# Patient Record
Sex: FEMALE | Race: WHITE | Employment: STUDENT | URBAN - NONMETROPOLITAN AREA
[De-identification: names, ages, dates, MRNs, and addresses within clinical notes are randomized per-mention and may not be internally consistent; named-entity substitution may affect disease eponyms.]

---

## 2017-05-12 ENCOUNTER — HOSPITAL ENCOUNTER (OUTPATIENT)
Age: 42
Setting detail: SPECIMEN
Discharge: HOME | End: 2017-05-12
Payer: COMMERCIAL

## 2017-05-12 ENCOUNTER — OFFICE VISIT (OUTPATIENT)
Dept: UROLOGY | Age: 42
End: 2017-05-12
Payer: COMMERCIAL

## 2017-05-12 VITALS
HEIGHT: 69 IN | DIASTOLIC BLOOD PRESSURE: 78 MMHG | BODY MASS INDEX: 26.51 KG/M2 | SYSTOLIC BLOOD PRESSURE: 142 MMHG | WEIGHT: 179 LBS

## 2017-05-12 DIAGNOSIS — R31.29 MICROSCOPIC HEMATURIA: Primary | ICD-10-CM

## 2017-05-12 DIAGNOSIS — R31.29 MICROSCOPIC HEMATURIA: ICD-10-CM

## 2017-05-12 LAB
-: ABNORMAL
AMORPHOUS: ABNORMAL
BACTERIA: ABNORMAL
BILIRUBIN URINE: NEGATIVE
CASTS UA: ABNORMAL /LPF
COLOR: YELLOW
COMMENT UA: ABNORMAL
CRYSTALS, UA: ABNORMAL /HPF
EPITHELIAL CELLS UA: ABNORMAL /HPF (ref 0–25)
GLUCOSE URINE: NEGATIVE
KETONES, URINE: NEGATIVE
LEUKOCYTE ESTERASE, URINE: NEGATIVE
MUCUS: ABNORMAL
NITRITE, URINE: NEGATIVE
OTHER OBSERVATIONS UA: ABNORMAL
PH UA: 6 (ref 5–9)
PROTEIN UA: NEGATIVE
RBC UA: ABNORMAL /HPF (ref 0–2)
RENAL EPITHELIAL, UA: ABNORMAL /HPF
SPECIFIC GRAVITY UA: 1.02 (ref 1.01–1.02)
TRICHOMONAS: ABNORMAL
TURBIDITY: CLEAR
URINE HGB: ABNORMAL
UROBILINOGEN, URINE: NORMAL
WBC UA: ABNORMAL /HPF (ref 0–5)
YEAST: ABNORMAL

## 2017-05-12 PROCEDURE — 88112 CYTOPATH CELL ENHANCE TECH: CPT

## 2017-05-12 PROCEDURE — 81001 URINALYSIS AUTO W/SCOPE: CPT

## 2017-05-12 PROCEDURE — 87086 URINE CULTURE/COLONY COUNT: CPT

## 2017-05-12 PROCEDURE — 99204 OFFICE O/P NEW MOD 45 MIN: CPT | Performed by: PHYSICIAN ASSISTANT

## 2017-05-12 RX ORDER — HYDROCODONE BITARTRATE AND ACETAMINOPHEN 5; 325 MG/1; MG/1
TABLET ORAL
Refills: 0 | Status: ON HOLD | COMMUNITY
Start: 2017-04-10 | End: 2020-10-23 | Stop reason: ALTCHOICE

## 2017-05-12 ASSESSMENT — ENCOUNTER SYMPTOMS
CONSTIPATION: 0
ABDOMINAL PAIN: 0
NAUSEA: 0
EYE PAIN: 0
SHORTNESS OF BREATH: 0
ABDOMINAL DISTENTION: 0
DIARRHEA: 0
VOMITING: 0
COUGH: 0

## 2017-05-13 LAB
CULTURE: NORMAL
CULTURE: NORMAL
Lab: NORMAL
Lab: NORMAL
SPECIMEN DESCRIPTION: NORMAL
SPECIMEN DESCRIPTION: NORMAL
STATUS: NORMAL

## 2017-05-15 LAB
CASE NUMBER:: NORMAL
SPECIMEN DESCRIPTION: NORMAL
SURGICAL PATHOLOGY REPORT: NORMAL

## 2017-06-19 ENCOUNTER — PROCEDURE VISIT (OUTPATIENT)
Dept: UROLOGY | Age: 42
End: 2017-06-19
Payer: COMMERCIAL

## 2017-06-19 VITALS
BODY MASS INDEX: 25.33 KG/M2 | WEIGHT: 171 LBS | HEIGHT: 69 IN | DIASTOLIC BLOOD PRESSURE: 78 MMHG | SYSTOLIC BLOOD PRESSURE: 124 MMHG

## 2017-06-19 DIAGNOSIS — R31.29 MICROHEMATURIA: Primary | ICD-10-CM

## 2017-06-19 PROCEDURE — 99213 OFFICE O/P EST LOW 20 MIN: CPT | Performed by: UROLOGY

## 2017-06-19 PROCEDURE — 52000 CYSTOURETHROSCOPY: CPT | Performed by: UROLOGY

## 2017-06-19 RX ORDER — HYDROXYZINE PAMOATE 25 MG/1
CAPSULE ORAL
Refills: 1 | Status: ON HOLD | COMMUNITY
Start: 2017-06-12 | End: 2020-10-23

## 2017-06-19 ASSESSMENT — ENCOUNTER SYMPTOMS
COUGH: 0
SHORTNESS OF BREATH: 0
COLOR CHANGE: 0
NAUSEA: 0
ABDOMINAL PAIN: 0
WHEEZING: 0
EYE REDNESS: 0
VOMITING: 0
BACK PAIN: 0
EYE PAIN: 0

## 2017-06-29 ENCOUNTER — TELEPHONE (OUTPATIENT)
Dept: NEUROLOGY | Age: 42
End: 2017-06-29

## 2018-01-09 RX ORDER — PRENATAL VIT/IRON FUM/FOLIC AC 27MG-0.8MG
1 TABLET ORAL DAILY
Qty: 30 TABLET | Refills: 2 | OUTPATIENT
Start: 2018-01-09

## 2020-10-22 ENCOUNTER — HOSPITAL ENCOUNTER (INPATIENT)
Age: 45
LOS: 7 days | Discharge: HOME | DRG: 753 | End: 2020-10-29
Attending: PSYCHIATRY & NEUROLOGY | Admitting: PSYCHIATRY & NEUROLOGY
Payer: COMMERCIAL

## 2020-10-22 PROBLEM — F23 ACUTE PSYCHOSIS (HCC): Status: ACTIVE | Noted: 2020-10-22

## 2020-10-22 PROCEDURE — 1240000000 HC EMOTIONAL WELLNESS R&B

## 2020-10-22 PROCEDURE — 6370000000 HC RX 637 (ALT 250 FOR IP): Performed by: PSYCHIATRY & NEUROLOGY

## 2020-10-22 PROCEDURE — 6370000000 HC RX 637 (ALT 250 FOR IP): Performed by: INTERNAL MEDICINE

## 2020-10-22 RX ORDER — NICOTINE 21 MG/24HR
1 PATCH, TRANSDERMAL 24 HOURS TRANSDERMAL DAILY
Status: DISCONTINUED | OUTPATIENT
Start: 2020-10-22 | End: 2020-10-29 | Stop reason: HOSPADM

## 2020-10-22 RX ORDER — HYDROXYZINE 50 MG/1
50 TABLET, FILM COATED ORAL 3 TIMES DAILY PRN
Status: DISCONTINUED | OUTPATIENT
Start: 2020-10-22 | End: 2020-10-29 | Stop reason: HOSPADM

## 2020-10-22 RX ORDER — HALOPERIDOL 5 MG/ML
5 INJECTION INTRAMUSCULAR EVERY 4 HOURS PRN
Status: DISCONTINUED | OUTPATIENT
Start: 2020-10-22 | End: 2020-10-29 | Stop reason: HOSPADM

## 2020-10-22 RX ORDER — POLYETHYLENE GLYCOL 3350 17 G/17G
17 POWDER, FOR SOLUTION ORAL DAILY PRN
Status: DISCONTINUED | OUTPATIENT
Start: 2020-10-22 | End: 2020-10-29 | Stop reason: HOSPADM

## 2020-10-22 RX ORDER — ACETAMINOPHEN 325 MG/1
650 TABLET ORAL EVERY 4 HOURS PRN
Status: DISCONTINUED | OUTPATIENT
Start: 2020-10-22 | End: 2020-10-29 | Stop reason: HOSPADM

## 2020-10-22 RX ORDER — HALOPERIDOL 10 MG/1
5 TABLET ORAL EVERY 4 HOURS PRN
Status: DISCONTINUED | OUTPATIENT
Start: 2020-10-22 | End: 2020-10-29 | Stop reason: HOSPADM

## 2020-10-22 RX ORDER — MAGNESIUM HYDROXIDE/ALUMINUM HYDROXICE/SIMETHICONE 120; 1200; 1200 MG/30ML; MG/30ML; MG/30ML
30 SUSPENSION ORAL EVERY 6 HOURS PRN
Status: DISCONTINUED | OUTPATIENT
Start: 2020-10-22 | End: 2020-10-29 | Stop reason: HOSPADM

## 2020-10-22 RX ORDER — LORAZEPAM 1 MG/1
2 TABLET ORAL EVERY 4 HOURS PRN
Status: DISCONTINUED | OUTPATIENT
Start: 2020-10-22 | End: 2020-10-29 | Stop reason: HOSPADM

## 2020-10-22 RX ORDER — TRAZODONE HYDROCHLORIDE 50 MG/1
50 TABLET ORAL NIGHTLY PRN
Status: DISCONTINUED | OUTPATIENT
Start: 2020-10-22 | End: 2020-10-29 | Stop reason: HOSPADM

## 2020-10-22 RX ORDER — DIPHENHYDRAMINE HYDROCHLORIDE 50 MG/ML
50 INJECTION INTRAMUSCULAR; INTRAVENOUS EVERY 4 HOURS PRN
Status: DISCONTINUED | OUTPATIENT
Start: 2020-10-22 | End: 2020-10-29 | Stop reason: HOSPADM

## 2020-10-22 RX ORDER — AMLODIPINE BESYLATE 5 MG/1
5 TABLET ORAL DAILY
Status: DISCONTINUED | OUTPATIENT
Start: 2020-10-22 | End: 2020-10-29 | Stop reason: HOSPADM

## 2020-10-22 RX ORDER — LORAZEPAM 2 MG/ML
2 INJECTION INTRAMUSCULAR EVERY 4 HOURS PRN
Status: DISCONTINUED | OUTPATIENT
Start: 2020-10-22 | End: 2020-10-29 | Stop reason: HOSPADM

## 2020-10-22 RX ADMIN — HYDROXYZINE HYDROCHLORIDE 50 MG: 50 TABLET, FILM COATED ORAL at 16:24

## 2020-10-22 RX ADMIN — AMLODIPINE BESYLATE 5 MG: 5 TABLET ORAL at 16:24

## 2020-10-22 ASSESSMENT — SLEEP AND FATIGUE QUESTIONNAIRES
RESTFUL SLEEP: NO
SLEEP PATTERN: DISTURBED/INTERRUPTED SLEEP;DIFFICULTY FALLING ASLEEP
DIFFICULTY ARISING: NO
DO YOU HAVE DIFFICULTY SLEEPING: YES
DIFFICULTY STAYING ASLEEP: YES
AVERAGE NUMBER OF SLEEP HOURS: 4
DO YOU USE A SLEEP AID: NO
DIFFICULTY FALLING ASLEEP: YES

## 2020-10-22 ASSESSMENT — LIFESTYLE VARIABLES: HISTORY_ALCOHOL_USE: NO
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
## 2020-10-22 NOTE — PROGRESS NOTES
RT staff unable to complete RT assessment this date. Staff will attempt to assess in the next 24 hours.

## 2020-10-22 NOTE — BH NOTE
Patient is anxious as evidenced by increased internal stimuli, hitting fist on bed, and crying. Patient refused one to one talk time, shower, and decreased stimuli. Patient accepting of PRN. Will continue to monitor.

## 2020-10-22 NOTE — BH NOTE
Patient given tobacco quitline number 19238689496 at this time, refusing to call at this time, states \" I just dont want to quit now\"- patient given information as to the dangers of long term tobacco use. Continue to reinforce the importance of tobacco cessation.

## 2020-10-23 PROCEDURE — 6370000000 HC RX 637 (ALT 250 FOR IP): Performed by: PSYCHIATRY & NEUROLOGY

## 2020-10-23 PROCEDURE — 6370000000 HC RX 637 (ALT 250 FOR IP): Performed by: INTERNAL MEDICINE

## 2020-10-23 PROCEDURE — 99254 IP/OBS CNSLTJ NEW/EST MOD 60: CPT | Performed by: INTERNAL MEDICINE

## 2020-10-23 PROCEDURE — 99223 1ST HOSP IP/OBS HIGH 75: CPT | Performed by: PSYCHIATRY & NEUROLOGY

## 2020-10-23 PROCEDURE — 1240000000 HC EMOTIONAL WELLNESS R&B

## 2020-10-23 RX ORDER — QUETIAPINE FUMARATE 50 MG/1
50 TABLET, FILM COATED ORAL ONCE
Status: COMPLETED | OUTPATIENT
Start: 2020-10-23 | End: 2020-10-23

## 2020-10-23 RX ORDER — QUETIAPINE FUMARATE 100 MG/1
100 TABLET, FILM COATED ORAL 2 TIMES DAILY
Status: DISCONTINUED | OUTPATIENT
Start: 2020-10-23 | End: 2020-10-24

## 2020-10-23 RX ORDER — MONTELUKAST SODIUM 10 MG/1
10 TABLET ORAL NIGHTLY
Status: DISCONTINUED | OUTPATIENT
Start: 2020-10-23 | End: 2020-10-29 | Stop reason: HOSPADM

## 2020-10-23 RX ORDER — ALBUTEROL SULFATE 90 UG/1
2 AEROSOL, METERED RESPIRATORY (INHALATION) EVERY 6 HOURS PRN
Status: DISCONTINUED | OUTPATIENT
Start: 2020-10-23 | End: 2020-10-29 | Stop reason: HOSPADM

## 2020-10-23 RX ORDER — BUDESONIDE AND FORMOTEROL FUMARATE DIHYDRATE 160; 4.5 UG/1; UG/1
2 AEROSOL RESPIRATORY (INHALATION) 2 TIMES DAILY
Status: DISCONTINUED | OUTPATIENT
Start: 2020-10-23 | End: 2020-10-29 | Stop reason: HOSPADM

## 2020-10-23 RX ADMIN — QUETIAPINE FUMARATE 100 MG: 100 TABLET ORAL at 20:58

## 2020-10-23 RX ADMIN — HYDROXYZINE HYDROCHLORIDE 50 MG: 50 TABLET, FILM COATED ORAL at 20:58

## 2020-10-23 RX ADMIN — AMLODIPINE BESYLATE 5 MG: 5 TABLET ORAL at 09:17

## 2020-10-23 RX ADMIN — QUETIAPINE FUMARATE 50 MG: 50 TABLET ORAL at 14:16

## 2020-10-23 RX ADMIN — MONTELUKAST 10 MG: 10 TABLET, FILM COATED ORAL at 20:58

## 2020-10-23 ASSESSMENT — SLEEP AND FATIGUE QUESTIONNAIRES
DO YOU USE A SLEEP AID: NO
RESTFUL SLEEP: NO
SLEEP PATTERN: DIFFICULTY FALLING ASLEEP
DO YOU HAVE DIFFICULTY SLEEPING: YES
DIFFICULTY FALLING ASLEEP: YES
DIFFICULTY ARISING: NO
DIFFICULTY STAYING ASLEEP: YES
AVERAGE NUMBER OF SLEEP HOURS: 4

## 2020-10-23 NOTE — SUICIDE SAFETY PLAN
SAFETY PLAN    A suicide Safety Plan is a document that supports someone when they are having thoughts of suicide. Warning Signs that indicate a suicidal crisis may be developing: What (situations, thoughts, feelings, body sensations, behaviors, etc.) do you experience that lets you know you are beginning to think about suicide? 1. I feel my mind racing. 2. People accuse me of acting crazy  3. I'm having conflict with my . Internal Coping Strategies:  What things can I do (relaxation techniques, hobbies, physical activities, etc.) to take my mind off my problems without contacting another person? 1. Go for walk  2. Listen to music  3. Spend time with dog    People and social settings that provide distraction: Who can I call or where can I go to distract me? 1. Name: My   Phone: look in phone  2. Name: My mom Phone: look in phone       People whom I can ask for help: Who can I call when I need help - for example, friends, family, clergy, someone else? 1. Name: My                 Phone: look in phone  2. Name: My mom Phone: look in phone      Professionals or [address] [place] [address] I can contact during a crisis: Who can I call for help - for example, my doctor, my psychiatrist, my psychologist, a mental health provider, a suicide hotline? 1. [facility] [phone]    3. Suicide Prevention Lifeline: 3-649-387-TALK (9325)    1. 4. National Association of Mental Illness, 6-104.402.5127  2. [facility] Substance Abuse National Helpline, 2-983-182-HELP (7355)  3. Crisis Text Line, Text 4HOPE to 740789 to connect with a crisis counselor  4. National Domestic Violence Hotline, 7-590.471.9355  [place] (Rape, [address]), 7-715.747.8254    Making the environment safe: How can I make my environment (house/apartment/living space) safer? For example, can I remove guns, medications, and other items? 1. Take my medications as prescribed  2.  Talk out my feelings and frustrations.

## 2020-10-23 NOTE — CONSULTS
[facility]    CONSULTATION  / FOLLOW UP VISIT       Date:   10/23/2020  Patient name:  Rc Medley May  Date of admission:  10/22/2020  3:06 PM  MRN:   [id number]  Account:  [de-identified]  YOB: 1975  PCP:    Ena Barron MD  Room:   0110113-01  Code Status:    Full Code    Physician Requesting Consult: Nery Queen MD    History of Present Illness:      C/C ;       REASON FOR CONSULT;  Medical comorbidity and medication management ;                                                 *Active Problems:    Essential hypertension    Migraine without aura and without status migrainosus, not intractable    MS (multiple sclerosis) (HCC)    Moderate intermittent asthma    Acute psychosis ([address]  Resolved Problems:    * No resolved hospital problems. *            HPI;     10/23/2020    · We have been consulted to see this patient for medical management of medical comorbidities 1. Patient is known to have hypertension and is on amlodipine  2. Patient known to have asthma and is on Symbicort and albuterol  3. Patient also has a history of multiple sclerosis migraines in the past         History on admission;    Prior to Admission medications    Not on File      Blood pressur . he is mildly elevated     Significant last 24 hr data reviewed  [x] yes     Vitals:    10/22/20 1500 10/22/20 1509 10/22/20 1528 10/23/20 0917   BP: (!) 151/81 (!) 151/81  (!) 150/79   Pulse: 86 86 86    Resp: 18      Weight: 134 lb (60.8 kg)      Height: 5' 7\" (1.702 m)         No results found for this or any previous visit (from the past 24 hour(s)). No results for input(s): POCGLU in the last 72 hours. No results found. ---     Past and surgical history as recorded in H&P  Social History:     Tobacco:    reports that she has been smoking cigarettes. She has never used smokeless tobacco.  Alcohol:      reports no history of alcohol use.   Drug Use:  reports no history of drug use. Review of Systems:     POSITIVE AND NEGATIVES AS DESCRIBED IN HISTORY OF PRESENT ILLNESS ;  IN ADDITION ;  Review of Systems          All other systems negative                Physical Exam:     Physical Exam   Vitals:    10/22/20 1500 10/22/20 1509 10/22/20 1528 10/23/20 0917   BP: (!) 151/81 (!) 151/81  (!) 150/79   Pulse: 86 86 86    Resp: 18      Weight: 134 lb (60.8 kg)      Height: 5' 7\" (1.702 m)                      Body mass index is 20.99 kg/m². General Appearance:   -, CO-OPERATIVE ,                                                        Pulmonary/Chest:        Clear to auscultation bilaterally . No wheezes, rales or rhonchi . Cardiovascular:            Normal rate, regular rhythm,                                          No murmur or  Gallop . Abdomen:                       Soft, non-tender                                                                                    Extremities:                    No Edema .                                               Mental status as per psych notes         Data:     Labs:      URINE ANALYSIS: No results found for: LABURIN     CBC:  Lab Results   Component Value Date    WBC 10.5 02/19/2016    HGB 14.4 02/19/2016     02/19/2016        BMP:    Lab Results   Component Value Date     02/19/2016    K 4.0 02/19/2016     02/19/2016    CO2 22 02/19/2016    BUN 13 02/19/2016    CREATININE 0.62 02/19/2016    GLUCOSE 96 02/19/2016      LIVER PROFILE:  Lab Results   Component Value Date    ALT 18 02/19/2016    AST 19 02/19/2016    PROT 7.8 02/19/2016    BILITOT 0.35 02/19/2016    LABALBU 4.5 02/19/2016             Radiology:           Assessment :      Primary Problem  Active Problems:    Essential hypertension    Migraine without aura and without status migrainosus, not intractable    MS (multiple sclerosis) (HCC)    Moderate intermittent asthma    Acute psychosis ([address]  Resolved

## 2020-10-23 NOTE — PLAN OF CARE
Problem: Anger Management/Homicidal Ideation:  Goal: Absence of angry outbursts  Description: Absence of angry outbursts  Outcome: Met This Shift  Note: Patient flight of ideas and increased energy during assessment, patient visibly sweating, she denies substance abuse, refused evening vitals but allowed all other assessments, paranoid that she does not need to be here and someone else made her come and nothing is wrong with her, after being alone in her room and decreasing her stimuli she is getting some rest     Problem: Anger Management/Homicidal Ideation:  Goal: Absence of homicidal ideation  Description: Absence of homicidal ideation  Outcome: Met This Shift  Note: Denies saying \"they made that up to get me in here, but I'm not blaming you.      Problem: Altered Mood, Manic Behavior:  Goal: Ability to demonstrate self-control will improve  Description: Ability to demonstrate self-control will improve  Outcome: Met This Shift  Note: Patient refused vitals but otherwise cooperative with care, flight of ideas and pressured speech during interview but cooperative with care, denies any needs on assessment     Problem: Altered Mood, Manic Behavior:  Goal: Able to verbalize decrease in frequency and intensity of racing thoughts  Description: Able to verbalize decrease in frequency and intensity of racing thoughts  Outcome: Ongoing  Note: On initial assessment patient has flight of ideas and exhibits racing thoughts, after time in her room with decreased stimuli patient was able to fall asleep on her own

## 2020-10-23 NOTE — GROUP NOTE
Group Therapy Note    Date: 10/23/2020    Group Start Time: 1330  Group End Time: 8498  Group Topic: Cognitive Skills    [facility]    [place], [place]        Group Therapy Note    Attendees: 6/20         Patient's Goal:  To increase interpersonal interaction. Notes:  Pt was in and out of group several times, and had trouble following directions while in group.      Status After Intervention:  Unchanged    Participation Level: Interactive    Participation Quality: Sharing and Intrusive      Speech:  pressured      Thought Process/Content: Flight of ideas      Affective Functioning: Exaggerated      Mood: elevated      Level of consciousness:  Inattentive      Response to Learning: Progressing to goal      Endings: None Reported    Modes of Intervention: Socialization, Problem-solving, Activity, Media and Reality-testing      Discipline Responsible: Psychoeducational Specialist      Signature:  Leah Villanueva
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
## 2020-10-23 NOTE — GROUP NOTE
Group Therapy Note    Date: 10/23/2020    Group Start Time: 1100  Group End Time: 9482  Group Topic: Psychoeducation    BRAD Klein, CTRS        Group Therapy Note    Attendees: 3    Pt did not attend Therapeutic Recreation d/t resting in room despite staff invitation to attend. 1:1 talk time offered as alternative to group session, pt declined.
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
## 2020-10-23 NOTE — PLAN OF CARE
[facility]  Initial Interdisciplinary Treatment Plan NO      Original treatment plan Date & Time: 10/23/2020   0808    Admission Type:  Admission Type:  Involuntary    Reason for admission:   Reason for Admission: paranoid persecutory thoughts homicidal ideation towards  has not been taking medicine for 1.5 years    Estimated Length of Stay:  5-7days  Estimated Discharge Date: to be determined by physician    PATIENT STRENGTHS:  Patient Strengths:Strengths: Motivated  Patient Strengths and Limitations:Limitations: External locus of control, Lacks leisure interests  Addictive Behavior: Addictive Behavior  In the past 3 months, have you felt or has someone told you that you have a problem with:  : Eating (too much/too little)  Do you have a history of Chemical Use?: No  Do you have a history of Alcohol Use?: No  Do you have a history of Street Drug Abuse?: No  Histroy of Prescripton Drug Abuse?: No  Medical Problems:  Past Medical History:   Diagnosis Date    Allergic rhinitis     Asthma     Headache     Hypertension      Status EXAM:Status and Exam  Normal: No  Facial Expression: Exaggerated  Affect: Incongruent  Level of Consciousness: Alert  Mood:Normal: No  Mood: Anxious, Suspicious  Motor Activity:Normal: No  Motor Activity: Repetitive Acts  Interview Behavior: Cooperative  Preception: [place] to Person, [place] to Time, [place] to Place, [place] to Situation  Attention:Normal: No  Attention: Distractible  Thought Processes: Flt.of Ideas  Thought Content:Normal: No  Thought Content: Paranoia, Preoccupations  Hallucinations: (denies)  Delusions: Yes  Delusions: Persecution  Memory:Normal: No  Memory: Confabulation, Poor Recent, Poor Remote  Insight and Judgment: No  Insight and Judgment: Poor Judgment, Poor Insight  Present Suicidal Ideation: No  Present Homicidal Ideation: No    EDUCATION:   Learner Progress Toward Treatment Goals: reviewed group plans and strategies for care    Method:group therapy, medication compliance, individualized assessments and care planning    Outcome: needs reinforcement    PATIENT GOALS: to be discussed with patient within 72 hours    PLAN/TREATMENT RECOMMENDATIONS:     continue group therapy , medications compliance, goal setting, individualized assessments and care, continue to monitor pt on unit      SHORT-TERM GOALS:   Time frame for Short-Term Goals: 5-7 days    LONG-TERM GOALS:  Time frame for Long-Term Goals: 6 months  Members Present in Team Meeting: See [facility] Ray Sweet

## 2020-10-23 NOTE — GROUP NOTE
Group Therapy Note    Date: 10/23/2020    Group Start Time: 1600  Group End Time: 1640  Group Topic: Healthy Living/Wellness    [facility]    Ladarius White LPN    Patient refused to attend 1600 Health and Wellness group. 1:1 alternative was offered.     Group Therapy Note    Attendees: 7       Signature:  Ladarius White LPN

## 2020-10-23 NOTE — PROGRESS NOTES
Pharmacy Medication History Note      List of current medications patient is taking is complete. Source of information: [facility], [facility]    Per pharmacy, the patient has not filled any prescriptions since January. At that time she had only filled an antibiotic. Please let me know if you have any questions about this encounter. Thank you!     Electronically signed by Jamal Sorensen, [address] [place] [address] on 10/23/2020 at 9:15 AM

## 2020-10-23 NOTE — PLAN OF CARE
Problem: Altered Mood, Manic Behavior:  Goal: Able to verbalize decrease in frequency and intensity of racing thoughts  Description: Able to verbalize decrease in frequency and intensity of racing thoughts  10/23/2020 1141 by Chacho Bourne LPN  Outcome: Ongoing   Patient is manic/hyperverbal. Patient is directable. Denies thoughts of self harm. Support and encouragement provided.

## 2020-10-23 NOTE — GROUP NOTE
Group Therapy Note    Date: 10/23/2020    Group Start Time: 0900  Group End Time: 0915  Group Topic: Community Meeting    BRAD Muñoz, [address]        Group Therapy Note    Attendees: 5/22      Pt did not attend [employer] d/t resting in room despite staff invitation to attend. 1:1 talk time offered as alternative to group session, pt declined.

## 2020-10-23 NOTE — GROUP NOTE
Group Therapy Note    Date: 10/23/2020    Group Start Time: 1000  Group End Time: 1100  Group Topic: Psychotherapy    [facility]    CARLITOS Khan        Group Therapy Note    Attendees: 4/11       Patient's Goal:  To actively participate in psychotherapy group and remain in the here-and-now    Notes:  Client actively participates in group as it relates to discussing things they can control in their lives as well as things that are out of their control    Status After Intervention:  Unchanged    Participation Level: Monopolizing    Participation Quality: Sharing, Inappropriate and Intrusive      Speech:  pressured and loud      Thought Process/Content: Flight of ideas      Affective Functioning: Incongruent and Exaggerated      Mood: anxious      Level of consciousness:  Alert, Attentive and Preoccupied      Response to Learning: Able to verbalize current knowledge/experience      Endings: None Reported    Modes of Intervention: Education, Exploration and Confrontation      Discipline Responsible: /Counselor      Signature:  CARLITOS Khan

## 2020-10-24 LAB
ABSOLUTE EOS #: 0.1 K/UL (ref 0–0.4)
ABSOLUTE IMMATURE GRANULOCYTE: ABNORMAL K/UL (ref 0–0.3)
ABSOLUTE LYMPH #: 2.1 K/UL (ref 1–4.8)
ABSOLUTE MONO #: 0.6 K/UL (ref 0.1–1.3)
ANION GAP SERPL CALCULATED.3IONS-SCNC: 7 MMOL/L (ref 9–17)
BASOPHILS # BLD: 1 % (ref 0–2)
BASOPHILS ABSOLUTE: 0 K/UL (ref 0–0.2)
BUN BLDV-MCNC: 13 MG/DL (ref 6–20)
BUN/CREAT BLD: ABNORMAL (ref 9–20)
CALCIUM SERPL-MCNC: 9.6 MG/DL (ref 8.6–10.4)
CHLORIDE BLD-SCNC: 108 MMOL/L (ref 98–107)
CO2: 28 MMOL/L (ref 20–31)
CREAT SERPL-MCNC: 0.67 MG/DL (ref 0.5–0.9)
DIFFERENTIAL TYPE: ABNORMAL
EOSINOPHILS RELATIVE PERCENT: 2 % (ref 0–4)
GFR AFRICAN AMERICAN: >60 ML/MIN
GFR NON-AFRICAN AMERICAN: >60 ML/MIN
GFR SERPL CREATININE-BSD FRML MDRD: ABNORMAL ML/MIN/{1.73_M2}
GFR SERPL CREATININE-BSD FRML MDRD: ABNORMAL ML/MIN/{1.73_M2}
GLUCOSE BLD-MCNC: 101 MG/DL (ref 70–99)
HCT VFR BLD CALC: 40.2 % (ref 36–46)
HEMOGLOBIN: 13.9 G/DL (ref 12–16)
IMMATURE GRANULOCYTES: ABNORMAL %
LYMPHOCYTES # BLD: 30 % (ref 24–44)
MCH RBC QN AUTO: 31.6 PG (ref 26–34)
MCHC RBC AUTO-ENTMCNC: 34.7 G/DL (ref 31–37)
MCV RBC AUTO: 91.2 FL (ref 80–100)
MONOCYTES # BLD: 9 % (ref 1–7)
NRBC AUTOMATED: ABNORMAL PER 100 WBC
PDW BLD-RTO: 14 % (ref 11.5–14.9)
PLATELET # BLD: 319 K/UL (ref 150–450)
PLATELET ESTIMATE: ABNORMAL
PMV BLD AUTO: 6.9 FL (ref 6–12)
POTASSIUM SERPL-SCNC: 4.9 MMOL/L (ref 3.7–5.3)
RBC # BLD: 4.4 M/UL (ref 4–5.2)
RBC # BLD: ABNORMAL 10*6/UL
SEG NEUTROPHILS: 58 % (ref 36–66)
SEGMENTED NEUTROPHILS ABSOLUTE COUNT: 4.1 K/UL (ref 1.3–9.1)
SODIUM BLD-SCNC: 143 MMOL/L (ref 135–144)
TSH SERPL DL<=0.05 MIU/L-ACNC: 0.79 MIU/L (ref 0.3–5)
WBC # BLD: 7 K/UL (ref 3.5–11)
WBC # BLD: ABNORMAL 10*3/UL

## 2020-10-24 PROCEDURE — 6370000000 HC RX 637 (ALT 250 FOR IP): Performed by: PSYCHIATRY & NEUROLOGY

## 2020-10-24 PROCEDURE — 80048 BASIC METABOLIC PNL TOTAL CA: CPT

## 2020-10-24 PROCEDURE — 99231 SBSQ HOSP IP/OBS SF/LOW 25: CPT | Performed by: INTERNAL MEDICINE

## 2020-10-24 PROCEDURE — 36415 COLL VENOUS BLD VENIPUNCTURE: CPT

## 2020-10-24 PROCEDURE — 6370000000 HC RX 637 (ALT 250 FOR IP): Performed by: NURSE PRACTITIONER

## 2020-10-24 PROCEDURE — 1240000000 HC EMOTIONAL WELLNESS R&B

## 2020-10-24 PROCEDURE — 85025 COMPLETE CBC W/AUTO DIFF WBC: CPT

## 2020-10-24 PROCEDURE — 6370000000 HC RX 637 (ALT 250 FOR IP): Performed by: INTERNAL MEDICINE

## 2020-10-24 PROCEDURE — 99232 SBSQ HOSP IP/OBS MODERATE 35: CPT | Performed by: NURSE PRACTITIONER

## 2020-10-24 PROCEDURE — 84443 ASSAY THYROID STIM HORMONE: CPT

## 2020-10-24 RX ADMIN — ACETAMINOPHEN 650 MG: 325 TABLET, FILM COATED ORAL at 21:45

## 2020-10-24 RX ADMIN — QUETIAPINE FUMARATE 100 MG: 100 TABLET ORAL at 08:47

## 2020-10-24 RX ADMIN — AMLODIPINE BESYLATE 5 MG: 5 TABLET ORAL at 08:47

## 2020-10-24 RX ADMIN — MONTELUKAST 10 MG: 10 TABLET, FILM COATED ORAL at 21:45

## 2020-10-24 RX ADMIN — QUETIAPINE FUMARATE 150 MG: 100 TABLET ORAL at 21:45

## 2020-10-24 ASSESSMENT — PAIN SCALES - GENERAL
PAINLEVEL_OUTOF10: 0
PAINLEVEL_OUTOF10: 10
PAINLEVEL_OUTOF10: 3

## 2020-10-24 ASSESSMENT — PAIN DESCRIPTION - ORIENTATION: ORIENTATION: LEFT

## 2020-10-24 ASSESSMENT — PAIN DESCRIPTION - LOCATION: LOCATION: ARM

## 2020-10-24 NOTE — GROUP NOTE
Group Therapy Note    Date: 10/24/2020    Group Start Time: 1000  Group End Time: 2266  Group Topic: Psychoeducation    Via MALA Hidalgo, EDGARW        Group Therapy Note    Attendees: 6/18         Patient's Goal: Communication, critical thinking and active listening    Notes:  Brayden Sandra is Like. Che Jean Claude \" Game to describe items. Pt shared, participated, interactive, supportive. Pts engaged throughout activity and cooperative with others. Identified coping skill to slow thoughts and improve mood     Status After Intervention:  Improved    Participation Level: Active Listener and Interactive    Participation Quality: Appropriate, Attentive, Sharing and Supportive      Speech:  normal      Thought Process/Content: Logical  Linear      Affective Functioning: Congruent      Mood: euphoric and euthymic      Level of consciousness:  Alert, Oriented x4 and Attentive      Response to Learning: Able to retain information, Capable of insight and Progressing to goal      Endings: None Reported    Modes of Intervention: Education, Socialization, Exploration and Activity      Discipline Responsible: /Counselor      Signature:   MALA Vigil LSW

## 2020-10-24 NOTE — PROGRESS NOTES
content:  denies homicidal ideation  Suicidal Ideation: Denies suicidal ideation  Delusions:  no evidence of delusions  Perceptual Disturbance:  denies any perceptual disturbance  Cognition:  Oriented to person, place   Memory: Impaired  Insight & Judgement: Poor  Medication side effects:  denies       Data   height is 5' 7\" (1.702 m) and weight is 134 lb (60.8 kg). Her oral temperature is 98.2 °F (36.8 °C). Her blood pressure is 139/67 and her pulse is 93. Her respiration is 16.    Labs:   Admission on 10/22/2020   Component Date Value Ref Range Status    WBC 10/24/2020 7.0  3.5 - 11.0 k/uL Final    RBC 10/24/2020 4.40  4.0 - 5.2 m/uL Final    Hemoglobin 10/24/2020 13.9  12.0 - 16.0 g/dL Final    Hematocrit 10/24/2020 40.2  36 - 46 % Final    MCV 10/24/2020 91.2  80 - 100 fL Final    MCH 10/24/2020 31.6  26 - 34 pg Final    MCHC 10/24/2020 34.7  31 - 37 g/dL Final    RDW 10/24/2020 14.0  11.5 - 14.9 % Final    Platelets 77/11/3023 319  150 - 450 k/uL Final    MPV 10/24/2020 6.9  6.0 - 12.0 fL Final    NRBC Automated 10/24/2020 NOT REPORTED  per 100 WBC Final    Differential Type 10/24/2020 NOT REPORTED   Final    Seg Neutrophils 10/24/2020 58  36 - 66 % Final    Lymphocytes 10/24/2020 30  24 - 44 % Final    Monocytes 10/24/2020 9* 1 - 7 % Final    Eosinophils % 10/24/2020 2  0 - 4 % Final    Basophils 10/24/2020 1  0 - 2 % Final    Immature Granulocytes 10/24/2020 NOT REPORTED  0 % Final    Segs Absolute 10/24/2020 4.10  1.3 - 9.1 k/uL Final    Absolute Lymph # 10/24/2020 2.10  1.0 - 4.8 k/uL Final    Absolute Mono # 10/24/2020 0.60  0.1 - 1.3 k/uL Final    Absolute Eos # 10/24/2020 0.10  0.0 - 0.4 k/uL Final    Basophils Absolute 10/24/2020 0.00  0.0 - 0.2 k/uL Final    Absolute Immature Granulocyte 10/24/2020 NOT REPORTED  0.00 - 0.30 k/uL Final    WBC Morphology 10/24/2020 NOT REPORTED   Final    RBC Morphology 10/24/2020 NOT REPORTED   Final    Platelet Estimate 42/74/7590 NOT REPORTED Final    Glucose 10/24/2020 101* 70 - 99 mg/dL Final    BUN 10/24/2020 13  6 - 20 mg/dL Final    CREATININE 10/24/2020 0.67  0.50 - 0.90 mg/dL Final    Bun/Cre Ratio 10/24/2020 NOT REPORTED  9 - 20 Final    Calcium 10/24/2020 9.6  8.6 - 10.4 mg/dL Final    Sodium 10/24/2020 143  135 - 144 mmol/L Final    Potassium 10/24/2020 4.9  3.7 - 5.3 mmol/L Final    Chloride 10/24/2020 108* 98 - 107 mmol/L Final    CO2 10/24/2020 28  20 - 31 mmol/L Final    Anion Gap 10/24/2020 7* 9 - 17 mmol/L Final    GFR Non- 10/24/2020 >60  >60 mL/min Final    GFR  10/24/2020 >60  >60 mL/min Final    GFR Comment 10/24/2020        Final    Comment: Average GFR for 38-51 years old:   80 mL/min/1.73sq m  Chronic Kidney Disease:   <60 mL/min/1.73sq m  Kidney failure:   <15 mL/min/1.73sq m              eGFR calculated using average adult body mass.  Additional eGFR calculator available at:        [employer]            GFR Staging 10/24/2020 NOT REPORTED   Final    TSH 10/24/2020 0.79  0.30 - 5.00 mIU/L Final            Medications  Current Facility-Administered Medications: QUEtiapine (SEROQUEL) tablet 150 mg, 150 mg, Oral, BID  budesonide-formoterol (SYMBICORT) 160-4.5 MCG/ACT inhaler 2 puff, 2 puff, Inhalation, BID  montelukast (SINGULAIR) tablet 10 mg, 10 mg, Oral, Nightly  albuterol sulfate  (90 Base) MCG/ACT inhaler 2 puff, 2 puff, Inhalation, Q6H PRN  acetaminophen (TYLENOL) tablet 650 mg, 650 mg, Oral, Q4H PRN  aluminum & magnesium hydroxide-simethicone (MAALOX) 200-200-20 MG/5ML suspension 30 mL, 30 mL, Oral, Q6H PRN  hydrOXYzine (ATARAX) tablet 50 mg, 50 mg, Oral, TID PRN  traZODone (DESYREL) tablet 50 mg, 50 mg, Oral, Nightly PRN  polyethylene glycol (GLYCOLAX) packet 17 g, 17 g, Oral, Daily PRN  nicotine polacrilex (NICORETTE) gum 2 mg, 2 mg, Oral, PRN  nicotine (NICODERM CQ) 14 MG/24HR 1 patch, 1 patch, Transdermal, Daily  diphenhydrAMINE (BENADRYL) injection 50 mg, 50 mg, Intramuscular, Q4H PRN **AND** LORazepam (ATIVAN) injection 2 mg, 2 mg, Intramuscular, Q4H PRN **AND** haloperidol lactate (HALDOL) injection 5 mg, 5 mg, Intramuscular, Q4H PRN  haloperidol (HALDOL) tablet 5 mg, 5 mg, Oral, Q4H PRN **AND** LORazepam (ATIVAN) tablet 2 mg, 2 mg, Oral, Q4H PRN  amLODIPine (NORVASC) tablet 5 mg, 5 mg, Oral, Daily    ASSESSMENT  Bipolar, moustapha    PLAN  Patients symptoms are improving, specifically with regard to sleep  Increase quetiapine to 150 mg twice daily  Monitor need and frequency of as needed medication  Encourage participation in groups and milieu  Attempt to develop insight  Follow-up daily while on inpatient unit    Electronically signed by JAZIEL Chauhan CNP on 10/24/20 at 1:24 PM EDT    **This report has been created using voice recognition software. It may contain minor errors which are inherent in voice recognition technology. **

## 2020-10-24 NOTE — CONSULTS
[facility] Internal Medicine    CONSULTATION  / FOLLOW UP VISIT       Date:   10/24/2020  Patient name:  My Childs May  Date of admission:  10/22/2020  3:06 PM  MRN:   [id number]  Account:  [de-identified]  YOB: 1975  PCP:    Loren Sunshine MD  Room:   [facility]0113-  Code Status:    Full Code    Physician Requesting Consult: Kevan Restrepo MD    History of Present Illness:      C/C ;       REASON FOR CONSULT;  Medical comorbidity and medication management ;                                                 *Active Problems:    Essential hypertension    Migraine without aura and without status migrainosus, not intractable    MS (multiple sclerosis) (HCC)    Moderate intermittent asthma    Acute psychosis ([address]  Resolved Problems:    * No resolved hospital problems. *            HPI;     10/24/2020    · We have been consulted to see this patient for medical management of medical comorbidities 1. Patient is known to have hypertension and is on amlodipine  2. Patient known to have asthma and is on Symbicort and albuterol  3. Patient also has a history of multiple sclerosis migraines in the past         History on admission;    Prior to Admission medications    Not on File      Blood pressur . he is mildly elevated     Significant last 24 hr data reviewed  [x] yes     Vitals:    10/22/20 1528 10/23/20 0917 10/23/20 2044 10/24/20 0823   BP:  (!) 150/79 117/79 139/67   Pulse: 86  87 93   Resp:   14 16   Temp:   98.4 °F (36.9 °C) 98.2 °F (36.8 °C)   TempSrc:   Oral Oral   Weight:       Height:          Recent Results (from the past 24 hour(s))   CBC with DIFF    Collection Time: 10/24/20  6:44 AM   Result Value Ref Range    WBC 7.0 3.5 - 11.0 k/uL    RBC 4.40 4.0 - 5.2 m/uL    Hemoglobin 13.9 12.0 - 16.0 g/dL    Hematocrit 40.2 36 - 46 %    MCV 91.2 80 - 100 fL    MCH 31.6 26 - 34 pg    MCHC 34.7 31 - 37 g/dL    RDW 14.0 11.5 - 14.9 %    Platelets 624 684 - 282 k/uL    MPV 6.9 6.0 - 12.0 fL    NRBC Automated NOT REPORTED per 100 WBC    Differential Type NOT REPORTED     Seg Neutrophils 58 36 - 66 %    Lymphocytes 30 24 - 44 %    Monocytes 9 (H) 1 - 7 %    Eosinophils % 2 0 - 4 %    Basophils 1 0 - 2 %    Immature Granulocytes NOT REPORTED 0 %    Segs Absolute 4.10 1.3 - 9.1 k/uL    Absolute Lymph # 2.10 1.0 - 4.8 k/uL    Absolute Mono # 0.60 0.1 - 1.3 k/uL    Absolute Eos # 0.10 0.0 - 0.4 k/uL    Basophils Absolute 0.00 0.0 - 0.2 k/uL    Absolute Immature Granulocyte NOT REPORTED 0.00 - 0.30 k/uL    WBC Morphology NOT REPORTED     RBC Morphology NOT REPORTED     Platelet Estimate NOT REPORTED    BASIC METABOLIC PANEL    Collection Time: 10/24/20  6:44 AM   Result Value Ref Range    Glucose 101 (H) 70 - 99 mg/dL    BUN 13 6 - 20 mg/dL    CREATININE 0.67 0.50 - 0.90 mg/dL    Bun/Cre Ratio NOT REPORTED 9 - 20    Calcium 9.6 8.6 - 10.4 mg/dL    Sodium 143 135 - 144 mmol/L    Potassium 4.9 3.7 - 5.3 mmol/L    Chloride 108 (H) 98 - 107 mmol/L    CO2 28 20 - 31 mmol/L    Anion Gap 7 (L) 9 - 17 mmol/L    GFR Non-African American >60 >60 mL/min    GFR African American >60 >60 mL/min    GFR Comment          GFR Staging NOT REPORTED    TSH with Reflex    Collection Time: 10/24/20  6:44 AM   Result Value Ref Range    TSH 0.79 0.30 - 5.00 mIU/L     No results for input(s): POCGLU in the last 72 hours. No results found. ---     Past and surgical history as recorded in H&P  Social History:     Tobacco:    reports that she has been smoking cigarettes. She has never used smokeless tobacco.  Alcohol:      reports no history of alcohol use. Drug Use:  reports no history of drug use.     Review of Systems:     POSITIVE AND NEGATIVES AS DESCRIBED IN HISTORY OF PRESENT ILLNESS ;  IN ADDITION ;  Review of Systems          All other systems negative                Physical Exam:     Physical Exam   Vitals:    10/22/20 1528 10/23/20 0917 10/23/20 2044 10/24/20 0823   BP:  (!) 150/79 117/79 139/67 Pulse: 86  87 93   Resp:   14 16   Temp:   98.4 °F (36.9 °C) 98.2 °F (36.8 °C)   TempSrc:   Oral Oral   Weight:       Height:                       Body mass index is 20.99 kg/m². General Appearance:   -, CO-OPERATIVE ,                                                        Pulmonary/Chest:        Clear to auscultation bilaterally . No wheezes, rales or rhonchi . Cardiovascular:            Normal rate, regular rhythm,                                          No murmur or  Gallop . Abdomen:                       Soft, non-tender                                                                                    Extremities:                    No Edema . Mental status as per psych notes         Data:     Labs:      URINE ANALYSIS: No results found for: LABURIN     CBC:  Lab Results   Component Value Date    WBC 7.0 10/24/2020    HGB 13.9 10/24/2020     10/24/2020        BMP:    Lab Results   Component Value Date     10/24/2020    K 4.9 10/24/2020     10/24/2020    CO2 28 10/24/2020    BUN 13 10/24/2020    CREATININE 0.67 10/24/2020    GLUCOSE 101 10/24/2020      LIVER PROFILE:  Lab Results   Component Value Date    ALT 18 02/19/2016    AST 19 02/19/2016    PROT 7.8 02/19/2016    BILITOT 0.35 02/19/2016    LABALBU 4.5 02/19/2016             Radiology:           Assessment :      Primary Problem  Active Problems:    Essential hypertension    Migraine without aura and without status migrainosus, not intractable    MS (multiple sclerosis) (HCC)    Moderate intermittent asthma    Acute psychosis ([facility] [address]  Resolved Problems:    * No resolved hospital problems. *              Plan:      Will resume Symbicort albuterol and Singulair for asthma  Will continue amlodipine for blood pressure and adjust according to the needs  Elevated blood work in accordingly urologist     10/23/20    · Blood pressure controlled fair 1.   Add meds for asthma  2. History of multiple sclerosis and migraines          10/24/20  Asthma under fair control  No headaches  Vital stable blood pressure okay       Medications: Allergies: Allergies   Allergen Reactions    Sulfa Antibiotics Anaphylaxis    Asa [Aspirin]        Current Meds:   Scheduled Meds:    QUEtiapine  100 mg Oral BID    budesonide-formoterol  2 puff Inhalation BID    montelukast  10 mg Oral Nightly    nicotine  1 patch Transdermal Daily    amLODIPine  5 mg Oral Daily     Continuous Infusions:   PRN Meds: albuterol sulfate HFA, acetaminophen, aluminum & magnesium hydroxide-simethicone, hydrOXYzine, traZODone, polyethylene glycol, nicotine polacrilex, diphenhydrAMINE **AND** LORazepam **AND** haloperidol lactate, haloperidol **AND** LORazepam      Thanks for consulting us . Will monitorvitals and clinical course , and  Optimize therapy  as needed . Hesham San MD    Copy sent to Dr. Ena Barron MD    Pleasenote that this chart was generated using voice recognition Dragon dictation software. Although every effort was made to ensure the accuracy of this automated transcription, some errors in transcription may have occurred.

## 2020-10-24 NOTE — GROUP NOTE
Group Therapy Note    Date: 10/24/2020    Group Start Time: 1330  Group End Time: 1400  Group Topic: Recreational    [address] [facility], [facility]    Patient refused to attend Recreational Therapy Group at 1330 after encouragement from staff. 1:1 talk time offered.     Signature:  Steven Davidson

## 2020-10-24 NOTE — PLAN OF CARE
[address] [place] [address]  Day 3 Interdisciplinary Treatment Plan NOTE    Review Date & Time: 10/24/2020 0258    Admission Type:   Admission Type:  Involuntary    Reason for admission:  Reason for Admission: paranoid persecutory thoughts homicidal ideation towards  has not been taking medicine for 1.5 years  Estimated Length of Stay: 5-7 days  Estimated Discharge Date Update: to be determined by physician    PATIENT STRENGTHS:  Patient Strengths Strengths: Communication, Positive Support  Patient Strengths and Limitations:Limitations: Difficult relationships / poor social skills, Difficulty problem solving/relies on others to help solve problems  Addictive Behavior:Addictive Behavior  In the past 3 months, have you felt or has someone told you that you have a problem with:  : Eating (too much/too little)  Do you have a history of Chemical Use?: No  Do you have a history of Alcohol Use?: No  Do you have a history of Street Drug Abuse?: No  Histroy of Prescripton Drug Abuse?: No  Medical Problems:  Past Medical History:   Diagnosis Date    Allergic rhinitis     Asthma     Headache     Hypertension        Risk:  Fall RiskTotal: 75  Santi Scale Santi Scale Score: 22  BVC Total: 0  Change in scores no Changes to plan of Care no    Status EXAM:   Status and Exam  Normal: No  Facial Expression: Exaggerated  Affect: Blunt  Level of Consciousness: Alert  Mood:Normal: No  Mood: Anxious, Depressed, Suspicious  Motor Activity:Normal: No  Motor Activity: Increased  Interview Behavior: Evasive  Preception: Mel[place] to Person, [place] to Time, [place] to Place, [place] to Situation  Attention:Normal: No  Attention: Distractible, Unable to Concentrate  Thought Processes: Circumstantial, Flt.of Ideas  Thought Content:Normal: No  Thought Content: Preoccupations, Paranoia  Hallucinations: None  Delusions: No  Delusions: Persecution  Memory:Normal: No  Memory: Poor Recent  Insight and Judgment: No  Insight and Judgment: Poor Insight, Poor Judgment  Present Suicidal Ideation: No  Present Homicidal Ideation: No    Daily Assessment Last Entry:   Daily Sleep (WDL): Within Defined Limits         Patient Currently in Pain: Yes  Daily Nutrition (WDL): Within Defined Limits    Patient Monitoring:  Frequency of Checks: 4 times per hour, close    Psychiatric Symptoms:   Depression Symptoms  Depression Symptoms: Impaired concentration, Feelings of hopelessess  Anxiety Symptoms  Anxiety Symptoms: Generalized  Joslyn Symptoms  Joslyn Symptoms: Flight of ideas, Pressured speech, Rapid cycling     Psychosis Symptoms  Delusion Type: Paranoid    Suicide Risk CSSR-S:  1) Within the past month, have you wished you were dead or wished you could go to sleep and not wake up? : No  2) Have you actually had any thoughts of killing yourself? : No  6) Have you ever done anything, started to do anything, or prepared to do anything to end your life?: No  Change in Result: No Change in Plan of care: No      EDUCATION:   EDUCATION:   Learner Progress Toward Treatment Goals: Reviewed results and recommendations of this team, Reviewed group plan and strategies, Reviewed signs, symptoms and risk of self harm and violent behavior, Reviewed goals and plan of care    Method:small group, individual verbal education    Outcome:verbalized by patient, but needs reinforcement to obtain goals    PATIENT GOALS:  Short term: Decrease in racing thoughts. Discharge planning. Long term: Obtain employment.  Follow up with psychiatrist.     PLAN/TREATMENT RECOMMENDATIONS UPDATE: continue with group therapies, increased socialization, continue planning for after discharge goals, continue with medication compliance    SHORT-TERM GOALS UPDATE:   Time frame for Short-Term Goals: 5-7 days    LONG-TERM GOALS UPDATE:   Time frame for Long-Term Goals: 6 months  Members Present in Team Meeting: See Signature Sheet    Omid Valencia

## 2020-10-24 NOTE — GROUP NOTE
Group Therapy Note    Date: 10/24/2020    Group Start Time: 0900  Group End Time: 0915  Group Topic: Community Meeting    [address] [place] [address]    Patient refused to attend Goal Setting / Community Meeting Group at 0900 after encouragement from staff. 1:1 talk time offered.     Signature:  Rowdy Granados

## 2020-10-24 NOTE — PLAN OF CARE
Problem: Anger Management/Homicidal Ideation:  Goal: Absence of homicidal ideation  Description: Absence of homicidal ideation  10/24/2020 1100 by Shari Vines RN  Outcome: Ongoing   Patient denies thoughts of harming others at this time.     Problem: Altered Mood, Manic Behavior:  Goal: Able to verbalize decrease in frequency and intensity of racing thoughts  Description: Able to verbalize decrease in frequency and intensity of racing thoughts  10/24/2020 1100 by Shari Vines RN  Outcome: Ongoing  Patient admits to racing thoughts at this time and is hyperverbal.

## 2020-10-24 NOTE — GROUP NOTE
Group Therapy Note    Date: 10/24/2020    Group Start Time: 1115  Group End Time: 6800  Group Topic: Healthy Living/Wellness    [address], RN        Group Therapy Note    Attendees: 05         Patient's Goal:  Identify strengths and qualities    Notes:      Status After Intervention:  Improved    Participation Level: Active Listener and Interactive    Participation Quality: Appropriate, Attentive, Sharing and Supportive      Speech:  pressured      Thought Process/Content: Flight of ideas      Affective Functioning: Exaggerated      Mood: anxious, elevated and euphoric      Level of consciousness:  Alert      Response to Learning: Able to retain information and Capable of insight      Endings: None Reported    Modes of Intervention: Education, Support, Socialization and Activity      Discipline Responsible: Registered Nurse      Signature:   Sherri Sumner RN

## 2020-10-25 PROCEDURE — 99231 SBSQ HOSP IP/OBS SF/LOW 25: CPT | Performed by: INTERNAL MEDICINE

## 2020-10-25 PROCEDURE — 1240000000 HC EMOTIONAL WELLNESS R&B

## 2020-10-25 PROCEDURE — 99232 SBSQ HOSP IP/OBS MODERATE 35: CPT | Performed by: NURSE PRACTITIONER

## 2020-10-25 PROCEDURE — 6370000000 HC RX 637 (ALT 250 FOR IP): Performed by: INTERNAL MEDICINE

## 2020-10-25 PROCEDURE — 6370000000 HC RX 637 (ALT 250 FOR IP): Performed by: NURSE PRACTITIONER

## 2020-10-25 PROCEDURE — 6370000000 HC RX 637 (ALT 250 FOR IP): Performed by: PSYCHIATRY & NEUROLOGY

## 2020-10-25 RX ORDER — QUETIAPINE FUMARATE 200 MG/1
200 TABLET, FILM COATED ORAL 2 TIMES DAILY
Status: DISCONTINUED | OUTPATIENT
Start: 2020-10-25 | End: 2020-10-26

## 2020-10-25 RX ADMIN — QUETIAPINE FUMARATE 150 MG: 100 TABLET ORAL at 08:44

## 2020-10-25 RX ADMIN — AMLODIPINE BESYLATE 5 MG: 5 TABLET ORAL at 08:44

## 2020-10-25 RX ADMIN — HALOPERIDOL 5 MG: 10 TABLET ORAL at 20:05

## 2020-10-25 RX ADMIN — LORAZEPAM 2 MG: 1 TABLET ORAL at 20:06

## 2020-10-25 RX ADMIN — MONTELUKAST 10 MG: 10 TABLET, FILM COATED ORAL at 22:24

## 2020-10-25 RX ADMIN — QUETIAPINE FUMARATE 200 MG: 200 TABLET ORAL at 22:24

## 2020-10-25 ASSESSMENT — PAIN SCALES - GENERAL: PAINLEVEL_OUTOF10: 5

## 2020-10-25 ASSESSMENT — PAIN DESCRIPTION - ORIENTATION: ORIENTATION: LEFT

## 2020-10-25 ASSESSMENT — PAIN DESCRIPTION - LOCATION: LOCATION: ARM;HEAD

## 2020-10-25 NOTE — GROUP NOTE
Group Therapy Note    Date: 10/25/2020    Group Start Time: 0900  Group End Time: 0915  Group Topic: Community Meeting    [facility] [place], [address]    Attendees: 8         Patient's Goal:  To be informed of programming schedule for today and unit guidelines/rules. To verbalize obtainable short term goal for today pertaining to mental health and stability that can be achieved by this evening. Notes:  Patient verbalized goal for today to get the  to get her signed up for social security. Patient was argumentative, irritable and accusatory during group. Patient was only present for a couple of minutes before leaving the room and not returning.      Status After Intervention:  Unchanged    Participation Level: Minimal    Participation Quality: Resistant      Speech:  loud      Thought Process/Content: Flight of ideas      Affective Functioning: Incongruent      Mood: irritable      Level of consciousness:  Preoccupied and Inattentive      Response to Learning: Able to verbalize current knowledge/experience and Capable of insight      Endings: None Reported       Modes of Intervention: Support, Socialization, Exploration, Clarifying, Problem-solving, Confrontation, Limit-setting and Reality-testing      Discipline Responsible: Psychoeducational Specialist      Signature:  Sina Koroma

## 2020-10-25 NOTE — GROUP NOTE
Group Therapy Note    Date: 10/25/2020    Group Start Time: 1330  Group End Time: 6398  Group Topic: Recreational    [address] [facility], [facility]    Patient refused to attend Recreational Therapy Group at 1330 after encouragement from staff. 1:1 talk time offered.     Signature:  Froy Hyman

## 2020-10-25 NOTE — PROGRESS NOTES
Daily Progress Note  Kirby Trotter CNP  10/25/2020      CHIEF COMPLAINT: Joslyn    Reviewed patient's current plan of care and vital signs with nursing staff. Sleep: Per staff patient slept well last night  Attending groups: Yes    SUBJECTIVE:    Staff reports that patient remains medication compliant, they also report that she slept well overnight. Joy Gonzalez was pulled from group activity to engage in today's interview. She reports that she is having pain in bilateral hands because she slipped on the chair and they're \"making me write\" which she believes is making her pain worse. She states \"the Tylenol is not helping me\". She endorses some frustration towards her , she becomes tearful and says \"he left me to make decisions all by myself\". She then focuses on her mother and becomes extremely frustrated and states \"my mother will not  even take the time to talk to me on the telephone\". She reports that she slept well overnight. When assessing for auditory visual hallucination she reports that she saw \"a lightning streak\" passed by her in the dining area and she is not sure if it was real or not. She denies any thoughts of suicide or homicidal ideation intent or plan. She brings up group participation and frustration with staff, and then begins to talk about other patients on the unit. She was redirectable to discuss her current medication regimen, she denies any side effects at present dose.       Mental Status Exam  Level of consciousness:  Awake and alert  Appearance: Street clothing, seated in chair, with poor grooming and hygiene   Behavior/Motor: Restless, very animated during conversation   attitude toward examiner: Easily distracted, good eye contact  Speech: Rapid and pressured, irritable tone at times  mood: \"Okay\"   affect: Quite labile, laughing at times, tearful at times  Thought processes: Flight of ideas, tangential, redirectable to linear response with some effort   thought content: Final    Glucose 10/24/2020 101* 70 - 99 mg/dL Final    BUN 10/24/2020 13  6 - 20 mg/dL Final    CREATININE 10/24/2020 0.67  0.50 - 0.90 mg/dL Final    Bun/Cre Ratio 10/24/2020 NOT REPORTED  9 - 20 Final    Calcium 10/24/2020 9.6  8.6 - 10.4 mg/dL Final    Sodium 10/24/2020 143  135 - 144 mmol/L Final    Potassium 10/24/2020 4.9  3.7 - 5.3 mmol/L Final    Chloride 10/24/2020 108* 98 - 107 mmol/L Final    CO2 10/24/2020 28  20 - 31 mmol/L Final    Anion Gap 10/24/2020 7* 9 - 17 mmol/L Final    GFR Non- 10/24/2020 >60  >60 mL/min Final    GFR  10/24/2020 >60  >60 mL/min Final    GFR Comment 10/24/2020        Final    Comment: Average GFR for 38-51 years old:   80 mL/min/1.73sq m  Chronic Kidney Disease:   <60 mL/min/1.73sq m  Kidney failure:   <15 mL/min/1.73sq m              eGFR calculated using average adult body mass.  Additional eGFR calculator available at:        [employer]            GFR Staging 10/24/2020 NOT REPORTED   Final    TSH 10/24/2020 0.79  0.30 - 5.00 mIU/L Final            Medications  Current Facility-Administered Medications: QUEtiapine (SEROQUEL) tablet 200 mg, 200 mg, Oral, BID  budesonide-formoterol (SYMBICORT) 160-4.5 MCG/ACT inhaler 2 puff, 2 puff, Inhalation, BID  montelukast (SINGULAIR) tablet 10 mg, 10 mg, Oral, Nightly  albuterol sulfate  (90 Base) MCG/ACT inhaler 2 puff, 2 puff, Inhalation, Q6H PRN  acetaminophen (TYLENOL) tablet 650 mg, 650 mg, Oral, Q4H PRN  aluminum & magnesium hydroxide-simethicone (MAALOX) 200-200-20 MG/5ML suspension 30 mL, 30 mL, Oral, Q6H PRN  hydrOXYzine (ATARAX) tablet 50 mg, 50 mg, Oral, TID PRN  traZODone (DESYREL) tablet 50 mg, 50 mg, Oral, Nightly PRN  polyethylene glycol (GLYCOLAX) packet 17 g, 17 g, Oral, Daily PRN  nicotine polacrilex (NICORETTE) gum 2 mg, 2 mg, Oral, PRN  nicotine (NICODERM CQ) 14 MG/24HR 1 patch, 1 patch, Transdermal, Daily  diphenhydrAMINE (BENADRYL) injection 50 mg, 50 mg, Intramuscular, Q4H PRN **AND** LORazepam (ATIVAN) injection 2 mg, 2 mg, Intramuscular, Q4H PRN **AND** haloperidol lactate (HALDOL) injection 5 mg, 5 mg, Intramuscular, Q4H PRN  haloperidol (HALDOL) tablet 5 mg, 5 mg, Oral, Q4H PRN **AND** LORazepam (ATIVAN) tablet 2 mg, 2 mg, Oral, Q4H PRN  amLODIPine (NORVASC) tablet 5 mg, 5 mg, Oral, Daily    ASSESSMENT  Bipolar, moustapha    PLAN  Patients symptoms are unchanged  Increase quetiapine to 200 mg twice daily  Monitor need and frequency of as needed medication  Encourage participation in groups and milieu  Attempt to develop insight  Follow-up daily while on inpatient unit    Electronically signed by JAZIEL Kendrick CNP on 10/24/20 at 1:24 PM EDT    **This report has been created using voice recognition software. It may contain minor errors which are inherent in voice recognition technology. **

## 2020-10-25 NOTE — GROUP NOTE
Group Therapy Note    Date: 10/25/2020    Group Start Time: 1000  Group End Time: 6758  Group Topic: Psychoeducation    [facility]ARK    MALA Barros, CARLITOS        Group Therapy Note    Attendees: 5/21         Patient's Goal:  Safety Plan     Notes:  Interactive     Status After Intervention:  Improved    Participation Level: Interactive    Participation Quality: Appropriate, Attentive and Sharing      Speech:  normal      Thought Process/Content: Logical      Affective Functioning: Congruent      Mood: euthymic      Level of consciousness:  Alert and Oriented x4      Response to Learning: Able to change behavior      Endings: None Reported    Modes of Intervention: Education and Clarifying      Discipline Responsible: /Counselor      Signature:   MALA Barros, CARLITOS

## 2020-10-25 NOTE — CONSULTS
smokeless tobacco.  Alcohol:      reports no history of alcohol use. Drug Use:  reports no history of drug use. Review of Systems:     POSITIVE AND NEGATIVES AS DESCRIBED IN HISTORY OF PRESENT ILLNESS ;  IN ADDITION ;  Review of Systems          All other systems negative                Physical Exam:     Physical Exam   Vitals:    10/24/20 0823 10/24/20 2015 10/25/20 0800 10/25/20 0844   BP: 139/67 127/77 (!) 140/86 (!) 140/86   Pulse: 93 104 100    Resp: 16 14 14    Temp: 98.2 °F (36.8 °C) 97.6 °F (36.4 °C) 98.6 °F (37 °C)    TempSrc: Oral Oral Oral    Weight:       Height:                       Body mass index is 20.99 kg/m². General Appearance:   -, CO-OPERATIVE ,                                                        Pulmonary/Chest:        Clear to auscultation bilaterally . No wheezes, rales or rhonchi . Cardiovascular:            Normal rate, regular rhythm,                                          No murmur or  Gallop . Abdomen:                       Soft, non-tender                                                                                    Extremities:                    No Edema .                                               Mental status as per psych notes         Data:     Labs:      URINE ANALYSIS: No results found for: LABURIN     CBC:  Lab Results   Component Value Date    WBC 7.0 10/24/2020    HGB 13.9 10/24/2020     10/24/2020        BMP:    Lab Results   Component Value Date     10/24/2020    K 4.9 10/24/2020     10/24/2020    CO2 28 10/24/2020    BUN 13 10/24/2020    CREATININE 0.67 10/24/2020    GLUCOSE 101 10/24/2020      LIVER PROFILE:  Lab Results   Component Value Date    ALT 18 02/19/2016    AST 19 02/19/2016    PROT 7.8 02/19/2016    BILITOT 0.35 02/19/2016    LABALBU 4.5 02/19/2016             Radiology:           Assessment :      Primary Problem  Active Problems:    Essential hypertension    Migraine without aura and without status migrainosus, not intractable    MS (multiple sclerosis) (HCC)    Moderate intermittent asthma    Acute psychosis ([facility] [address]  Resolved Problems:    * No resolved hospital problems. *              Plan:     10/25/20    Will sign off . Thanks . · Please call again , if neeeded . 1.           Will resume Symbicort albuterol and Singulair for asthma  Will continue amlodipine for blood pressure and adjust according to the needs  Elevated blood work in accordingly urologist     10/23/20    · Blood pressure controlled fair 1. Add meds for asthma  2. History of multiple sclerosis and migraines          10/24/20  Asthma under fair control  No headaches  Vital stable blood pressure okay       Medications: Allergies: Allergies   Allergen Reactions    Sulfa Antibiotics Anaphylaxis    Asa [Aspirin]        Current Meds:   Scheduled Meds:    QUEtiapine  150 mg Oral BID    budesonide-formoterol  2 puff Inhalation BID    montelukast  10 mg Oral Nightly    nicotine  1 patch Transdermal Daily    amLODIPine  5 mg Oral Daily     Continuous Infusions:   PRN Meds: albuterol sulfate HFA, acetaminophen, aluminum & magnesium hydroxide-simethicone, hydrOXYzine, traZODone, polyethylene glycol, nicotine polacrilex, diphenhydrAMINE **AND** LORazepam **AND** haloperidol lactate, haloperidol **AND** LORazepam      Thanks for consulting us . Will monitorvitals and clinical course , and  Optimize therapy  as needed . Johanna Chi MD    Copy sent to Dr. Shalonda Mccallum MD    Pleasenote that this chart was generated using voice recognition Dragon dictation software. Although every effort was made to ensure the accuracy of this automated transcription, some errors in transcription may have occurred.

## 2020-10-26 PROCEDURE — 93005 ELECTROCARDIOGRAM TRACING: CPT

## 2020-10-26 PROCEDURE — 6370000000 HC RX 637 (ALT 250 FOR IP): Performed by: PSYCHIATRY & NEUROLOGY

## 2020-10-26 PROCEDURE — 99232 SBSQ HOSP IP/OBS MODERATE 35: CPT | Performed by: PSYCHIATRY & NEUROLOGY

## 2020-10-26 PROCEDURE — 1240000000 HC EMOTIONAL WELLNESS R&B

## 2020-10-26 PROCEDURE — 6370000000 HC RX 637 (ALT 250 FOR IP): Performed by: NURSE PRACTITIONER

## 2020-10-26 PROCEDURE — 6370000000 HC RX 637 (ALT 250 FOR IP): Performed by: INTERNAL MEDICINE

## 2020-10-26 RX ORDER — QUETIAPINE FUMARATE 300 MG/1
300 TABLET, FILM COATED ORAL 2 TIMES DAILY
Status: DISCONTINUED | OUTPATIENT
Start: 2020-10-26 | End: 2020-10-29 | Stop reason: HOSPADM

## 2020-10-26 RX ORDER — IBUPROFEN 400 MG/1
400 TABLET ORAL EVERY 6 HOURS PRN
Status: DISCONTINUED | OUTPATIENT
Start: 2020-10-26 | End: 2020-10-26

## 2020-10-26 RX ORDER — NAPROXEN 500 MG/1
250 TABLET ORAL 2 TIMES DAILY PRN
Status: DISCONTINUED | OUTPATIENT
Start: 2020-10-26 | End: 2020-10-29 | Stop reason: HOSPADM

## 2020-10-26 RX ORDER — BENZTROPINE MESYLATE 1 MG/1
1 TABLET ORAL 2 TIMES DAILY PRN
Status: DISCONTINUED | OUTPATIENT
Start: 2020-10-26 | End: 2020-10-29 | Stop reason: HOSPADM

## 2020-10-26 RX ADMIN — HYDROXYZINE HYDROCHLORIDE 50 MG: 50 TABLET, FILM COATED ORAL at 08:34

## 2020-10-26 RX ADMIN — MONTELUKAST 10 MG: 10 TABLET, FILM COATED ORAL at 21:31

## 2020-10-26 RX ADMIN — BENZTROPINE MESYLATE 1 MG: 1 TABLET ORAL at 09:55

## 2020-10-26 RX ADMIN — WITCH HAZEL 40 EACH: 500 SOLUTION RECTAL; TOPICAL at 22:14

## 2020-10-26 RX ADMIN — AMLODIPINE BESYLATE 5 MG: 5 TABLET ORAL at 08:29

## 2020-10-26 RX ADMIN — QUETIAPINE FUMARATE 200 MG: 200 TABLET ORAL at 08:29

## 2020-10-26 RX ADMIN — QUETIAPINE FUMARATE 300 MG: 300 TABLET ORAL at 21:31

## 2020-10-26 RX ADMIN — ACETAMINOPHEN 650 MG: 325 TABLET, FILM COATED ORAL at 08:34

## 2020-10-26 RX ADMIN — WITCH HAZEL 40 EACH: 500 SOLUTION RECTAL; TOPICAL at 16:06

## 2020-10-26 ASSESSMENT — PAIN SCALES - GENERAL
PAINLEVEL_OUTOF10: 10
PAINLEVEL_OUTOF10: 10

## 2020-10-26 ASSESSMENT — PAIN DESCRIPTION - PAIN TYPE: TYPE: CHRONIC PAIN

## 2020-10-26 ASSESSMENT — PAIN DESCRIPTION - LOCATION: LOCATION: GENERALIZED

## 2020-10-26 NOTE — BH NOTE
PRN  Patient c/o irritation/pain due to hemorrhoids. Dr. Maricarmen Andrade ordered tucks pad PRN to alleviate symptoms.

## 2020-10-26 NOTE — GROUP NOTE
Group Therapy Note    Date: 10/26/2020    Group Start Time: 1100  Group End Time: 9755  Group Topic: Psychoeducation    [facility]    [place], [place]        Group Therapy Note    Attendees: 4/23      Pt did not attend RT skills group d/t resting in room despite staff invitation to attend. 1:1 talk time offered as alternative to group session, pt declined.

## 2020-10-26 NOTE — GROUP NOTE
Group Therapy Note    Date: 10/26/2020    Group Start Time: 1430  Group End Time: 5816  Group Topic: Music Therapy    BRAD Duong Mins        Group Therapy Note    Pt did not attend music therapy group d/t resting in room despite staff invitation to attend. 1:1 talk time offered as alternative to group session, pt declined.

## 2020-10-26 NOTE — GROUP NOTE
Group Therapy Note    Date: 10/26/2020    Group Start Time: 1330  Group End Time: 3510  Group Topic: Cognitive Skills    BRAD Muñoz, [address]        Group Therapy Note    Attendees: 5/23      Pt did not attend RT skills group d/t resting in room despite staff invitation to attend. 1:1 talk time offered as alternative to group session, pt declined.

## 2020-10-26 NOTE — PLAN OF CARE
Problem: Anger Management/Homicidal Ideation:  Goal: Absence of angry outbursts  Description: Absence of angry outbursts  10/24/2020 2337 by Marlys Christie LPN  Outcome: Ongoing     Problem: Anger Management/Homicidal Ideation:  Goal: Absence of homicidal ideation  Description: Absence of homicidal ideation  10/24/2020 2337 by Marlys Christie LPN  Outcome: Ongoing     Problem: Altered Mood, Manic Behavior:  Goal: Ability to demonstrate self-control will improve  Description: Ability to demonstrate self-control will improve  10/24/2020 2337 by Marlys Christie LPN  Outcome: Ongoing   Remains cooperative t/o the evening. Denies homicidal thoughts.  Remains manic, less intrusive.

## 2020-10-26 NOTE — PLAN OF CARE
Problem: Anger Management/Homicidal Ideation:  Goal: Absence of angry outbursts  Description: Absence of angry outbursts  Outcome: Ongoing  Patient is easily irritable but remains in behavioral control. Problem: Anger Management/Homicidal Ideation:  Goal: Absence of homicidal ideation  Description: Absence of homicidal ideation  Outcome: Ongoing  Patient denies homicidal ideations at this time. Problem: Altered Mood, Manic Behavior:  Goal: Able to verbalize decrease in frequency and intensity of racing thoughts  Description: Able to verbalize decrease in frequency and intensity of racing thoughts  Outcome: Ongoing  Patient is hyper verbal, tangential, labile, and manic. She is depressed and anxious. Patient is medication compliant. She will not use her inhaler but takes oral meds. Problem: Altered Mood, Manic Behavior:  Goal: Ability to demonstrate self-control will improve  Description: Ability to demonstrate self-control will improve  Outcome: Ongoing  Patient is impulsive but able to maintain self control. Problem: Pain:  Goal: Pain level will decrease  Description: Pain level will decrease  Outcome: Ongoing  Patient has many pain locations. Patient has many somatic complaints. She is hard to understand at times due to speaking quickly. Writer could not differentiate exact pain locations accept hemorrhoid pain.

## 2020-10-26 NOTE — GROUP NOTE
Group Therapy Note    Date: 10/26/2020    Group Start Time: 0900  Group End Time: 0915  Group Topic: Community Meeting    BRAD Clancy        Group Therapy Note    Pt did not attend community meeting group d/t resting in room despite staff invitation to attend. 1:1 talk time offered as alternative to group session, pt declined.

## 2020-10-26 NOTE — PROGRESS NOTES
Daily Progress Note  10/26/2020      CHIEF COMPLAINT: Joslyn    Reviewed patient's current plan of care and vital signs with nursing staff. Sleep: 8 hours last night  Attending groups: no    SUBJECTIVE:    Interviewed patient in common area. Patient was extremely labile, with flight of ideas, and difficulty to redirect. Patient required Haldol and Ativan last night at 8 PM due to irritability and anxiety. Patient reports that her mood is depressed, and reported \"sometimes I just do not ever want to wake up\", she does contract for safety while on unit. Earlier today patient complained that her \"tongue was stiff\" she was given Cogentin and this resolved the symptom. Patient then began to talk about a nurse at [facility], she reports \"I will kick her as if I ever saw her\", when asked if she has contact with this nurse outside the hospital, patient reports \"I just say that I would never actually hurt her\". Patient then went on to talk about her marriage and how she met her  dancing on tables at a bar. Patient then became extremely happy and asked this writer to seeing her happy birthday because \"today is my birthday\". Patient then became tearful and reported that her mother does not understand her. Was able to redirect the conversation briefly at times, patient reports that she feels like her throat is smaller due to the Seroquel and does not want to take it anymore.       Mental Status Exam  Level of consciousness:  Awake and alert  Appearance: Street clothing, seated in chair, with poor grooming and hygiene   Behavior/Motor: Restless, very animated during conversation   attitude toward examiner: Easily distracted, good eye contact  Speech: Rapid and pressured, irritable tone at times  mood: \"Depressed\"   affect: Quite labile, laughing at times, tearful at times  Thought processes: Flight of ideas, tangential, redirectable to linear response with  effort   thought content:  denies homicidal ideation  Suicidal Ideation: Denies suicidal ideation, but does report \"sometimes I just do not ever want to wake up\"  Delusions:  no evidence of delusions  Perceptual Disturbance: Denies  Cognition:  Oriented to person, place   Memory: Impaired  Insight & Judgement: Poor  Medication side effects: Difficulty swallowing      Data   height is 5' 7\" (1.702 m) and weight is 134 lb (60.8 kg). Her oral temperature is 98.1 °F (36.7 °C). Her blood pressure is 115/68 and her pulse is 110. Her respiration is 14.    Labs:   Admission on 10/22/2020   Component Date Value Ref Range Status    WBC 10/24/2020 7.0  3.5 - 11.0 k/uL Final    RBC 10/24/2020 4.40  4.0 - 5.2 m/uL Final    Hemoglobin 10/24/2020 13.9  12.0 - 16.0 g/dL Final    Hematocrit 10/24/2020 40.2  36 - 46 % Final    MCV 10/24/2020 91.2  80 - 100 fL Final    MCH 10/24/2020 31.6  26 - 34 pg Final    MCHC 10/24/2020 34.7  31 - 37 g/dL Final    RDW 10/24/2020 14.0  11.5 - 14.9 % Final    Platelets 23/46/5656 319  150 - 450 k/uL Final    MPV 10/24/2020 6.9  6.0 - 12.0 fL Final    NRBC Automated 10/24/2020 NOT REPORTED  per 100 WBC Final    Differential Type 10/24/2020 NOT REPORTED   Final    Seg Neutrophils 10/24/2020 58  36 - 66 % Final    Lymphocytes 10/24/2020 30  24 - 44 % Final    Monocytes 10/24/2020 9* 1 - 7 % Final    Eosinophils % 10/24/2020 2  0 - 4 % Final    Basophils 10/24/2020 1  0 - 2 % Final    Immature Granulocytes 10/24/2020 NOT REPORTED  0 % Final    Segs Absolute 10/24/2020 4.10  1.3 - 9.1 k/uL Final    Absolute Lymph # 10/24/2020 2.10  1.0 - 4.8 k/uL Final    Absolute Mono # 10/24/2020 0.60  0.1 - 1.3 k/uL Final    Absolute Eos # 10/24/2020 0.10  0.0 - 0.4 k/uL Final    Basophils Absolute 10/24/2020 0.00  0.0 - 0.2 k/uL Final    Absolute Immature Granulocyte 10/24/2020 NOT REPORTED  0.00 - 0.30 k/uL Final    WBC Morphology 10/24/2020 NOT REPORTED   Final    RBC Morphology 10/24/2020 NOT REPORTED   Final    Platelet Estimate 10/24/2020 NOT REPORTED   Final    Glucose 10/24/2020 101* 70 - 99 mg/dL Final    BUN 10/24/2020 13  6 - 20 mg/dL Final    CREATININE 10/24/2020 0.67  0.50 - 0.90 mg/dL Final    Bun/Cre Ratio 10/24/2020 NOT REPORTED  9 - 20 Final    Calcium 10/24/2020 9.6  8.6 - 10.4 mg/dL Final    Sodium 10/24/2020 143  135 - 144 mmol/L Final    Potassium 10/24/2020 4.9  3.7 - 5.3 mmol/L Final    Chloride 10/24/2020 108* 98 - 107 mmol/L Final    CO2 10/24/2020 28  20 - 31 mmol/L Final    Anion Gap 10/24/2020 7* 9 - 17 mmol/L Final    GFR Non- 10/24/2020 >60  >60 mL/min Final    GFR  10/24/2020 >60  >60 mL/min Final    GFR Comment 10/24/2020        Final    Comment: Average GFR for 38-51 years old:   80 mL/min/1.73sq m  Chronic Kidney Disease:   <60 mL/min/1.73sq m  Kidney failure:   <15 mL/min/1.73sq m              eGFR calculated using average adult body mass.  Additional eGFR calculator available at:        [email or web address].br            GFR Staging 10/24/2020 NOT REPORTED   Final    TSH 10/24/2020 0.79  0.30 - 5.00 mIU/L Final            Medications  Current Facility-Administered Medications: benztropine (COGENTIN) tablet 1 mg, 1 mg, Oral, BID PRN  witch hazel-glycerin (TUCKS) pad, , Topical, PRN  QUEtiapine (SEROQUEL) tablet 200 mg, 200 mg, Oral, BID  budesonide-formoterol (SYMBICORT) 160-4.5 MCG/ACT inhaler 2 puff, 2 puff, Inhalation, BID  montelukast (SINGULAIR) tablet 10 mg, 10 mg, Oral, Nightly  albuterol sulfate  (90 Base) MCG/ACT inhaler 2 puff, 2 puff, Inhalation, Q6H PRN  acetaminophen (TYLENOL) tablet 650 mg, 650 mg, Oral, Q4H PRN  aluminum & magnesium hydroxide-simethicone (MAALOX) 200-200-20 MG/5ML suspension 30 mL, 30 mL, Oral, Q6H PRN  hydrOXYzine (ATARAX) tablet 50 mg, 50 mg, Oral, TID PRN  traZODone (DESYREL) tablet 50 mg, 50 mg, Oral, Nightly PRN  polyethylene glycol (GLYCOLAX) packet 17 g, 17 g, Oral, Daily PRN  nicotine polacrilex (NICORETTE) gum 2 mg, 2 mg, Oral, PRN  nicotine (NICODERM CQ) 14 MG/24HR 1 patch, 1 patch, Transdermal, Daily  diphenhydrAMINE (BENADRYL) injection 50 mg, 50 mg, Intramuscular, Q4H PRN **AND** LORazepam (ATIVAN) injection 2 mg, 2 mg, Intramuscular, Q4H PRN **AND** haloperidol lactate (HALDOL) injection 5 mg, 5 mg, Intramuscular, Q4H PRN  haloperidol (HALDOL) tablet 5 mg, 5 mg, Oral, Q4H PRN **AND** LORazepam (ATIVAN) tablet 2 mg, 2 mg, Oral, Q4H PRN  amLODIPine (NORVASC) tablet 5 mg, 5 mg, Oral, Daily    ASSESSMENT  Bipolar, moustapha    PLAN  Patients symptoms are unchanged  We will discuss with attending MD patient reported side effect to Seroquel  Encourage participation in groups and milieu  Attempt to develop insight  Follow-up daily while on inpatient unit      **This report has been created using voice recognition software. It may contain minor errors which are inherent in voice recognition technology. **  I independently saw and evaluated the patient. I reviewed the nurse practitioners documentation above. Any additional comments or changes to the nurse practitioners documentation are stated below otherwise agree with assessment. Patient has a number of somatic complaints. She remains irritable with speech and flight of ideas    Patient s symptoms   are improving    PLAN  Seroquel increased to 300 twice daily  Attempt to develop insight  Psycho-education conducted. Supportive Therapy conducted.   Probable discharge is next week  Follow-up daily while on inpatient unit    Electronically signed by Nita Reeves MD on 10/26/20 at 4:56 PM EDT

## 2020-10-26 NOTE — BH NOTE
Prn    Patient given tylenol due to generalized pain rated a 10 on a scale of 0-10. Patient given atarax due to high anxiety. She was upset due to another patient eating her breakfast tray. Another tray ordered. Patient accepting of meds.

## 2020-10-26 NOTE — BH NOTE
PRN    Patient c/o tongue heaviness and trouble swallowing. She is alert and oriented x 4. No problems with speech or breathing. Patient has no signs of swelling. Patient did receive haldol last night. Dr. Torey Schmidt ordered cogentin 1 mg PO BID PRN. Patient accepting of cogentin. Will continue to monitor.

## 2020-10-27 PROCEDURE — 6370000000 HC RX 637 (ALT 250 FOR IP): Performed by: INTERNAL MEDICINE

## 2020-10-27 PROCEDURE — 1240000000 HC EMOTIONAL WELLNESS R&B

## 2020-10-27 PROCEDURE — 6370000000 HC RX 637 (ALT 250 FOR IP): Performed by: PSYCHIATRY & NEUROLOGY

## 2020-10-27 PROCEDURE — 99232 SBSQ HOSP IP/OBS MODERATE 35: CPT | Performed by: PSYCHIATRY & NEUROLOGY

## 2020-10-27 RX ORDER — PANTOPRAZOLE SODIUM 40 MG/1
40 TABLET, DELAYED RELEASE ORAL
Status: DISCONTINUED | OUTPATIENT
Start: 2020-10-27 | End: 2020-10-29 | Stop reason: HOSPADM

## 2020-10-27 RX ADMIN — QUETIAPINE FUMARATE 300 MG: 300 TABLET ORAL at 21:43

## 2020-10-27 RX ADMIN — QUETIAPINE FUMARATE 300 MG: 300 TABLET ORAL at 09:17

## 2020-10-27 RX ADMIN — WITCH HAZEL: 500 SOLUTION RECTAL; TOPICAL at 10:54

## 2020-10-27 RX ADMIN — NAPROXEN 250 MG: 500 TABLET ORAL at 09:16

## 2020-10-27 RX ADMIN — AMLODIPINE BESYLATE 5 MG: 5 TABLET ORAL at 09:17

## 2020-10-27 RX ADMIN — ACETAMINOPHEN 650 MG: 325 TABLET, FILM COATED ORAL at 06:59

## 2020-10-27 RX ADMIN — MONTELUKAST 10 MG: 10 TABLET, FILM COATED ORAL at 21:43

## 2020-10-27 RX ADMIN — HYDROXYZINE HYDROCHLORIDE 50 MG: 50 TABLET, FILM COATED ORAL at 21:43

## 2020-10-27 RX ADMIN — WITCH HAZEL 40 EACH: 500 SOLUTION RECTAL; TOPICAL at 05:48

## 2020-10-27 ASSESSMENT — PAIN DESCRIPTION - LOCATION: LOCATION: GENERALIZED

## 2020-10-27 ASSESSMENT — PAIN SCALES - GENERAL
PAINLEVEL_OUTOF10: 6
PAINLEVEL_OUTOF10: 0
PAINLEVEL_OUTOF10: 10
PAINLEVEL_OUTOF10: 10

## 2020-10-27 NOTE — PLAN OF CARE
Problem: Anger Management/Homicidal Ideation:  Goal: Absence of angry outbursts  Description: Absence of angry outbursts  10/27/2020 1118 by Lenard Noble RN  Outcome: Ongoing  Patient continues to have angry outbursts towards patients on the unit. Patient is somewhat redirectable with staff, states \"I am just going to walk away and calm myself down. \"       Problem: Anger Management/Homicidal Ideation:  Goal: Absence of homicidal ideation  Description: Absence of homicidal ideation  10/27/2020 1118 by Lenard Noble RN  Outcome: Ongoing  Patient is currently denying thoughts to harm others. Problem: Altered Mood, Manic Behavior:  Goal: Able to verbalize decrease in frequency and intensity of racing thoughts  Description: Able to verbalize decrease in frequency and intensity of racing thoughts  10/27/2020 1118 by Lenard Noble RN  Outcome: Ongoing  Racing thoughts continues with patient. Patient is hyper verbal, labile, suspicious, and paranoid of patients. Problem: Altered Mood, Manic Behavior:  Goal: Ability to demonstrate self-control will improve  Description: Ability to demonstrate self-control will improve  10/27/2020 1118 by Lenard Noble RN  Outcome: Ongoing  Patient is alert, observed in day room. Patient is anxious and exaggerated on approach. Patient is currently denying thoughts of wanting to harm self or others. Patient is visible on unit, hyperverbal, labile, suspicious, and irritable of staff and peers. Mood is unstable with impaired concentration Patient is noted screaming at patient in the morning, then dancing and signing with same patient few hours later. Patient continues to pace halls and reports racing thoughts. Sleep and appetite adequate. Patient is medication compliant, denies any side effects. Patient encouraged to shower and attend to ADL's. Patient also encouraged to talk to peers appropriately and attend unit programming. No further concerns voiced.  Will

## 2020-10-27 NOTE — PROGRESS NOTES
Daily Progress Note  10/27/2020      CHIEF COMPLAINT: Joslyn    Reviewed patient's current plan of care and vital signs with nursing staff. Sleep: 8 hours last night  Attending groups: no    SUBJECTIVE:    Interviewed patient in common area. Per staff patient compliant with medication and attending groups. Staff report patient continues to be irritable and demanding. Patient Seroquel was increased to 300 mg twice a day yesterday. Interviewed patient as she was walking in halls. Patient denies any depression or suicidal ideation, she reports that she is irritated because she just \"wants Tucks pads and these bitch nurses will not give them to me and now I am constipated\". She believes the staff is keeping the medication from her on purpose. Was able to calm patient down slightly with the Tucks pads. Patient appeared less liable today, she was very irritable. She continued with flight of ideas and could redirect. She endorses having racing thoughts today, and feeling restless. She reports she slept very well last night. She denies any medication side effects, denies that she said yesterday that the Seroquel was \"making her throat close\". Mental Status Exam  Level of consciousness:  Awake and alert  Appearance: Street clothing, with poor grooming and hygiene   Behavior/Motor: Restless, very animated during conversation   attitude toward examiner: Easily distracted, good eye contact  Speech: Rapid and pressured, irritable tone   mood:  \"Irritated\"  affect: Extremely irritable  Thought processes: Flight of ideas, tangential, redirectable to linear response  thought content:  denies homicidal ideation  Suicidal Ideation: Denies suicidal ideation  Delusions: Paranoid, believes staff is purposely keeping medication from her  Perceptual Disturbance: Denies  Cognition:  Oriented to person, place   Memory: Impaired  Insight & Judgement: Poor  Medication side effects: Denies      Data   height is 5' 7\" (1.702 m) and weight is 134 lb (60.8 kg). Her oral temperature is 98.1 °F (36.7 °C). Her blood pressure is 113/65 and her pulse is 98. Her respiration is 16.    Labs:   Admission on 10/22/2020   Component Date Value Ref Range Status    WBC 10/24/2020 7.0  3.5 - 11.0 k/uL Final    RBC 10/24/2020 4.40  4.0 - 5.2 m/uL Final    Hemoglobin 10/24/2020 13.9  12.0 - 16.0 g/dL Final    Hematocrit 10/24/2020 40.2  36 - 46 % Final    MCV 10/24/2020 91.2  80 - 100 fL Final    MCH 10/24/2020 31.6  26 - 34 pg Final    MCHC 10/24/2020 34.7  31 - 37 g/dL Final    RDW 10/24/2020 14.0  11.5 - 14.9 % Final    Platelets 72/76/7525 319  150 - 450 k/uL Final    MPV 10/24/2020 6.9  6.0 - 12.0 fL Final    NRBC Automated 10/24/2020 NOT REPORTED  per 100 WBC Final    Differential Type 10/24/2020 NOT REPORTED   Final    Seg Neutrophils 10/24/2020 58  36 - 66 % Final    Lymphocytes 10/24/2020 30  24 - 44 % Final    Monocytes 10/24/2020 9* 1 - 7 % Final    Eosinophils % 10/24/2020 2  0 - 4 % Final    Basophils 10/24/2020 1  0 - 2 % Final    Immature Granulocytes 10/24/2020 NOT REPORTED  0 % Final    Segs Absolute 10/24/2020 4.10  1.3 - 9.1 k/uL Final    Absolute Lymph # 10/24/2020 2.10  1.0 - 4.8 k/uL Final    Absolute Mono # 10/24/2020 0.60  0.1 - 1.3 k/uL Final    Absolute Eos # 10/24/2020 0.10  0.0 - 0.4 k/uL Final    Basophils Absolute 10/24/2020 0.00  0.0 - 0.2 k/uL Final    Absolute Immature Granulocyte 10/24/2020 NOT REPORTED  0.00 - 0.30 k/uL Final    WBC Morphology 10/24/2020 NOT REPORTED   Final    RBC Morphology 10/24/2020 NOT REPORTED   Final    Platelet Estimate 97/12/1727 NOT REPORTED   Final    Glucose 10/24/2020 101* 70 - 99 mg/dL Final    BUN 10/24/2020 13  6 - 20 mg/dL Final    CREATININE 10/24/2020 0.67  0.50 - 0.90 mg/dL Final    Bun/Cre Ratio 10/24/2020 NOT REPORTED  9 - 20 Final    Calcium 10/24/2020 9.6  8.6 - 10.4 mg/dL Final    Sodium 10/24/2020 143  135 - 144 mmol/L Final    Potassium 10/24/2020 4.9  3.7 - 5.3 mmol/L Final    Chloride 10/24/2020 108* 98 - 107 mmol/L Final    CO2 10/24/2020 28  20 - 31 mmol/L Final    Anion Gap 10/24/2020 7* 9 - 17 mmol/L Final    GFR Non- 10/24/2020 >60  >60 mL/min Final    GFR  10/24/2020 >60  >60 mL/min Final    GFR Comment 10/24/2020        Final    Comment: Average GFR for 38-51 years old:   80 mL/min/1.73sq m  Chronic Kidney Disease:   <60 mL/min/1.73sq m  Kidney failure:   <15 mL/min/1.73sq m              eGFR calculated using average adult body mass.  Additional eGFR calculator available at:        [employer]            GFR Staging 10/24/2020 NOT REPORTED   Final    Ventricular Rate 10/26/2020 86  BPM Preliminary    Atrial Rate 10/26/2020 86  BPM Preliminary    P-R Interval 10/26/2020 140  ms Preliminary    QRS Duration 10/26/2020 92  ms Preliminary    Q-T Interval 10/26/2020 342  ms Preliminary    QTc Calculation (Bazett) 10/26/2020 409  ms Preliminary    P Axis 10/26/2020 55  degrees Preliminary    R Axis 10/26/2020 82  degrees Preliminary    T Axis 10/26/2020 63  degrees Preliminary    TSH 10/24/2020 0.79  0.30 - 5.00 mIU/L Final            Medications  Current Facility-Administered Medications: benztropine (COGENTIN) tablet 1 mg, 1 mg, Oral, BID PRN  witch hazel-glycerin (TUCKS) pad, , Topical, PRN  QUEtiapine (SEROQUEL) tablet 300 mg, 300 mg, Oral, BID  naproxen (NAPROSYN) tablet 250 mg, 250 mg, Oral, BID PRN  budesonide-formoterol (SYMBICORT) 160-4.5 MCG/ACT inhaler 2 puff, 2 puff, Inhalation, BID  montelukast (SINGULAIR) tablet 10 mg, 10 mg, Oral, Nightly  albuterol sulfate  (90 Base) MCG/ACT inhaler 2 puff, 2 puff, Inhalation, Q6H PRN  acetaminophen (TYLENOL) tablet 650 mg, 650 mg, Oral, Q4H PRN  aluminum & magnesium hydroxide-simethicone (MAALOX) 200-200-20 MG/5ML suspension 30 mL, 30 mL, Oral, Q6H PRN  hydrOXYzine (ATARAX) tablet 50 mg, 50 mg, Oral, TID PRN  traZODone (DESYREL) tablet 50 mg, 50 mg, Oral, Nightly PRN  polyethylene glycol (GLYCOLAX) packet 17 g, 17 g, Oral, Daily PRN  nicotine polacrilex (NICORETTE) gum 2 mg, 2 mg, Oral, PRN  nicotine (NICODERM CQ) 14 MG/24HR 1 patch, 1 patch, Transdermal, Daily  diphenhydrAMINE (BENADRYL) injection 50 mg, 50 mg, Intramuscular, Q4H PRN **AND** LORazepam (ATIVAN) injection 2 mg, 2 mg, Intramuscular, Q4H PRN **AND** haloperidol lactate (HALDOL) injection 5 mg, 5 mg, Intramuscular, Q4H PRN  haloperidol (HALDOL) tablet 5 mg, 5 mg, Oral, Q4H PRN **AND** LORazepam (ATIVAN) tablet 2 mg, 2 mg, Oral, Q4H PRN  amLODIPine (NORVASC) tablet 5 mg, 5 mg, Oral, Daily    ASSESSMENT  Bipolar, moustapha    PLAN  Patients symptoms are slightly improved  Continue current meds, reevaluate for possible dose adjustment tomorrow  Encourage participation in groups and milieu  Attempt to develop insight  Follow-up daily while on inpatient unit      **This report has been created using voice recognition software. It may contain minor errors which are inherent in voice recognition technology. **  I independently saw and evaluated the patient. I reviewed the nurse practitioners documentation above. Any additional comments or changes to the nurse practitioners documentation are stated below otherwise agree with assessment. The patient was loud and irritable. She was also labile about her continued stay in the hospital.  The patient believes that she needs Aleve and does not want to take ibuprofen    Patient s symptoms   are improving    PLAN  Will add Protonix 40 mg daily to have the patient tolerates ibuprofen  Attempt to develop insight  Psycho-education conducted. Supportive Therapy conducted.   Probable discharge is next week  Follow-up daily while on inpatient unit    Electronically signed by Kelle Patel MD on 10/26/20 at 4:56 PM EDT

## 2020-10-27 NOTE — GROUP NOTE
Group Therapy Note    Date: 10/27/2020    Group Start Time: 1000  Group End Time: 1030  Group Topic: Psychotherapy    [facility]    CARLITOS Epstein        Group Therapy Note    Attendees: 3/10         Patient's Goal: To actively participate in psychotherapy group and remain in the here-and-now    Notes: Client actively participates in group as it relates to healthy emotion-focused coping skills     Status After Intervention:  Unchanged    Participation Level: Monopolizing    Participation Quality: Sharing and Supportive      Speech:  pressured      Thought Process/Content: Flight of ideas      Affective Functioning: Exaggerated      Mood: anxious      Level of consciousness:  Alert      Response to Learning: Able to verbalize current knowledge/experience      Endings: None Reported    Modes of Intervention: Support and Exploration      Discipline Responsible: /Counselor      Signature:  CARLITOS Epstein

## 2020-10-27 NOTE — GROUP NOTE
Group Therapy Note    Date: 10/27/2020    Group Start Time: 1100  Group End Time: 2314  Group Topic: Recreational    [facility]    Patient refused to attend Recreational Therapy Group at 1100 after encouragement from staff. 1:1 talk time offered.     Signature:  Jessica Haro

## 2020-10-27 NOTE — GROUP NOTE
Group Therapy Note    Date: 10/27/2020    Group Start Time: 1600  Group End Time: 1700  Group Topic: Healthy Living/Wellness    BRAD CLARK    William Purvis LPN        Group Therapy Note    Attendees: 10/23         Patient's Goal:  wellness      Status After Intervention:  Unchanged    Participation Level: Monopolizing    Participation Quality: Intrusive      Speech:  pressured      Thought Process/Content: Flight of ideas      Affective Functioning: Exaggerated      Mood: euthymic      Level of consciousness:  Alert      Response to Learning: Able to verbalize current knowledge/experience      Endings: None Reported    Modes of Intervention: Education      Discipline Responsible: Licensed Practical Nurse      Signature:  William Purvis LPN

## 2020-10-27 NOTE — GROUP NOTE
Group Therapy Note    Date: 10/26/2020    Group Start Time: 2030  Group End Time: 2121  Group Topic: Wrap-Up    BRAD Wood        Group Therapy Note    Attendees: 13         Patient's Goal:  Pt rambled with rapid pressured speech    Notes:  Pt was unable to relate on an appropriate level    Status After Intervention:  Decompensated    Participation Level: Monopolizing    Participation Quality: Inappropriate      Speech:  pressured      Thought Process/Content: Delusional      Affective Functioning: Exaggerated      Mood: anxious and elevated      Level of consciousness:  Alert      Response to Learning: Resistant      Endings: None Reported    Modes of Intervention: Problem-solving      Discipline Responsible: [facility]      Signature:  Santino Nguyen

## 2020-10-27 NOTE — PLAN OF CARE
Problem: Anger Management/Homicidal Ideation:  Goal: Absence of angry outbursts  Description: Absence of angry outbursts  10/26/2020 2210 by Reese Sutton RN  Outcome: Ongoing  Note: Patient is irritable at times, patient is redirectable. Problem: Anger Management/Homicidal Ideation:  Goal: Absence of homicidal ideation  Description: Absence of homicidal ideation  10/26/2020 2210 by Reese Sutton RN  Outcome: Ongoing     Problem: Altered Mood, Manic Behavior:  Goal: Able to verbalize decrease in frequency and intensity of racing thoughts  Description: Able to verbalize decrease in frequency and intensity of racing thoughts  10/26/2020 2210 by Reese Sutton RN  Outcome: Ongoing  Note: Patient continues to report racing thoughts, patients mood is unstable. Patient continues to be hyper verbal during conversations with staff and peers. Patient reports anxiety and depression, stating today is her birthday and she got in a fight with her mother. Patient is out and social in day room with peers. Staff continues to encourage patient to be an active part of her own recovery. Staff maintains Q 15 minute safety checks. Problem: Altered Mood, Manic Behavior:  Goal: Ability to demonstrate self-control will improve  Description: Ability to demonstrate self-control will improve  10/26/2020 2210 by Reese Sutton RN  Outcome: Ongoing     Problem: Pain:  Goal: Pain level will decrease  Description: Pain level will decrease  10/26/2020 2210 by Reese Sutton RN  Outcome: Ongoing  Note: Patient reports pain in left arm, patient refused available pain medications.       Problem: Pain:  Goal: Control of acute pain  Description: Control of acute pain  10/26/2020 2210 by Reese Sutton RN  Outcome: Ongoing     Problem: Pain:  Goal: Control of chronic pain  Description: Control of chronic pain  10/26/2020 2210 by Reese Sutton RN  Outcome: Ongoing

## 2020-10-27 NOTE — GROUP NOTE
Group Therapy Note    Date: 10/27/2020    Group Start Time: 1330  Group End Time: 0400  Group Topic: Cognitive Skills    [facility]    [place], [place]        Group Therapy Note    Attendees: 5/23      Pt did not attend RT skills group d/t resting in room despite staff invitation to attend. 1:1 talk time offered as alternative to group session, pt declined.

## 2020-10-27 NOTE — GROUP NOTE
Group Therapy Note    Date: 10/27/2020    Group Start Time: 4484  Group End Time: 0920  Group Topic: [address] [place], [place]    Patient refused to attend Goal Setting / Community Meeting Group at 3129 after encouragement from staff. 1:1 talk time offered.     Signature:  Jordan Son

## 2020-10-28 PROCEDURE — 6370000000 HC RX 637 (ALT 250 FOR IP): Performed by: PSYCHIATRY & NEUROLOGY

## 2020-10-28 PROCEDURE — 6370000000 HC RX 637 (ALT 250 FOR IP): Performed by: INTERNAL MEDICINE

## 2020-10-28 PROCEDURE — U0003 INFECTIOUS AGENT DETECTION BY NUCLEIC ACID (DNA OR RNA); SEVERE ACUTE RESPIRATORY SYNDROME CORONAVIRUS 2 (SARS-COV-2) (CORONAVIRUS DISEASE [COVID-19]), AMPLIFIED PROBE TECHNIQUE, MAKING USE OF HIGH THROUGHPUT TECHNOLOGIES AS DESCRIBED BY CMS-2020-01-R: HCPCS

## 2020-10-28 PROCEDURE — 1240000000 HC EMOTIONAL WELLNESS R&B

## 2020-10-28 PROCEDURE — 99232 SBSQ HOSP IP/OBS MODERATE 35: CPT | Performed by: PSYCHIATRY & NEUROLOGY

## 2020-10-28 RX ORDER — ALBUTEROL SULFATE 90 UG/1
2 AEROSOL, METERED RESPIRATORY (INHALATION) EVERY 6 HOURS PRN
Qty: 1 INHALER | Refills: 3 | Status: SHIPPED | OUTPATIENT
Start: 2020-10-28

## 2020-10-28 RX ORDER — MONTELUKAST SODIUM 10 MG/1
10 TABLET ORAL NIGHTLY
Qty: 30 TABLET | Refills: 3 | Status: SHIPPED | OUTPATIENT
Start: 2020-10-28

## 2020-10-28 RX ORDER — BUDESONIDE AND FORMOTEROL FUMARATE DIHYDRATE 160; 4.5 UG/1; UG/1
2 AEROSOL RESPIRATORY (INHALATION) 2 TIMES DAILY
Qty: 1 INHALER | Refills: 3 | Status: SHIPPED | OUTPATIENT
Start: 2020-10-28

## 2020-10-28 RX ORDER — QUETIAPINE FUMARATE 300 MG/1
300 TABLET, FILM COATED ORAL 2 TIMES DAILY
Qty: 60 TABLET | Refills: 3 | Status: SHIPPED | OUTPATIENT
Start: 2020-10-28

## 2020-10-28 RX ADMIN — QUETIAPINE FUMARATE 300 MG: 300 TABLET ORAL at 21:41

## 2020-10-28 RX ADMIN — WITCH HAZEL 40 EACH: 500 SOLUTION RECTAL; TOPICAL at 20:43

## 2020-10-28 RX ADMIN — MONTELUKAST 10 MG: 10 TABLET, FILM COATED ORAL at 21:41

## 2020-10-28 RX ADMIN — HYDROXYZINE HYDROCHLORIDE 50 MG: 50 TABLET, FILM COATED ORAL at 21:42

## 2020-10-28 RX ADMIN — HYDROXYZINE HYDROCHLORIDE 50 MG: 50 TABLET, FILM COATED ORAL at 19:30

## 2020-10-28 RX ADMIN — AMLODIPINE BESYLATE 5 MG: 5 TABLET ORAL at 08:31

## 2020-10-28 RX ADMIN — WITCH HAZEL 40 EACH: 500 SOLUTION RECTAL; TOPICAL at 10:54

## 2020-10-28 RX ADMIN — PANTOPRAZOLE SODIUM 40 MG: 40 TABLET, DELAYED RELEASE ORAL at 08:31

## 2020-10-28 RX ADMIN — NAPROXEN 250 MG: 500 TABLET ORAL at 20:27

## 2020-10-28 RX ADMIN — QUETIAPINE FUMARATE 300 MG: 300 TABLET ORAL at 08:31

## 2020-10-28 ASSESSMENT — PAIN SCALES - GENERAL: PAINLEVEL_OUTOF10: 3

## 2020-10-28 NOTE — PLAN OF CARE
Problem: Anger Management/Homicidal Ideation:  Goal: Absence of angry outbursts  Description: Absence of angry outbursts  10/27/2020 2145 by Andressa Robles RN  Outcome: Ongoing   Pt is pleasant and cooperative  Problem: Altered Mood, Manic Behavior:  Goal: Able to verbalize decrease in frequency and intensity of racing thoughts  Description: Able to verbalize decrease in frequency and intensity of racing thoughts  10/27/2020 2145 by Andressa Robles RN  Outcome: Ongoing   She is hyperverbal, speech is pressured  Problem: Altered Mood, Manic Behavior:  Goal: Ability to demonstrate self-control will improve  Description: Ability to demonstrate self-control will improve  10/27/2020 2145 by Andressa Robles RN  Outcome: Ongoing   Boundaries are poor but she is accepting of direction when needed  Problem: Pain:  Goal: Pain level will decrease  Description: Pain level will decrease  Outcome: Ongoing   Pt is satisfied with pain management

## 2020-10-28 NOTE — CARE COORDINATION
Pt requested SW call [facility] to see if she could return there at discharge. RIVER spoke with Rosamaria Avery who stated cannot come back until the 29th. Pt will need a COVID test completed in order to return. Rosamaria Avery stated pt left to go -got him out of senior care and left shelter on 19th. Pt left  and stated she has no other places to go. Pt will need to take proof of negative COVID test with her to shelter or they will not let her in. Test needs to be done within 48 hours of discharge.

## 2020-10-28 NOTE — PROGRESS NOTES
CLINICAL PHARMACY NOTE: MEDS TO [address] Select Patient?: No  Total # of Prescriptions Filled: 4   The following medications were delivered to the patient:  · Albuterol HFA  · Quetiapine  · Symbicort  · Montelukast  ·   ·   Total # of Interventions Completed: 0  Time Spent (min): 30    Additional Documentation:

## 2020-10-28 NOTE — PROGRESS NOTES
Daily Progress Note  10/28/2020      CHIEF COMPLAINT: Joslyn    Reviewed patient's current plan of care and vital signs with nursing staff. Sleep: 8 hours last night  Attending groups: no    SUBJECTIVE:      The patient is frustrated with being on the unit. She continues to have some pressured speech and flight of ideas. She says that she always speaks fast.  The patient has been finding her medication somewhat sedating especially in the morning. Mental Status Exam  Level of consciousness:  Awake and alert  Appearance: Disheveled  Behavior/Motor: Restless,  attitude toward examiner: Cooperative, good eye contact  Speech: Rapid and pressured, irritable tone   mood: Irritable  affect: Mood congruent   thought processes: Linear goal-directed and coherent  thought content:  denies homicidal ideation  Suicidal Ideation: Denies suicidal ideation  Delusions: No delusions evident   perceptual Disturbance: Denies  Cognition:  Oriented to person, place   Memory: Recent and remote intact  Insight & Judgement: Fair  Medication side effects: Denies      Data   height is 5' 7\" (1.702 m) and weight is 134 lb (60.8 kg). Her oral temperature is 98.3 °F (36.8 °C). Her blood pressure is 120/100 (abnormal) and her pulse is 103. Her respiration is 16.    Labs:   Admission on 10/22/2020   Component Date Value Ref Range Status    WBC 10/24/2020 7.0  3.5 - 11.0 k/uL Final    RBC 10/24/2020 4.40  4.0 - 5.2 m/uL Final    Hemoglobin 10/24/2020 13.9  12.0 - 16.0 g/dL Final    Hematocrit 10/24/2020 40.2  36 - 46 % Final    MCV 10/24/2020 91.2  80 - 100 fL Final    MCH 10/24/2020 31.6  26 - 34 pg Final    MCHC 10/24/2020 34.7  31 - 37 g/dL Final    RDW 10/24/2020 14.0  11.5 - 14.9 % Final    Platelets 14/15/1224 319  150 - 450 k/uL Final    MPV 10/24/2020 6.9  6.0 - 12.0 fL Final    NRBC Automated 10/24/2020 NOT REPORTED  per 100 WBC Final    Differential Type 10/24/2020 NOT REPORTED   Final    Seg Neutrophils 10/24/2020 58 36 - 66 % Final    Lymphocytes 10/24/2020 30  24 - 44 % Final    Monocytes 10/24/2020 9* 1 - 7 % Final    Eosinophils % 10/24/2020 2  0 - 4 % Final    Basophils 10/24/2020 1  0 - 2 % Final    Immature Granulocytes 10/24/2020 NOT REPORTED  0 % Final    Segs Absolute 10/24/2020 4.10  1.3 - 9.1 k/uL Final    Absolute Lymph # 10/24/2020 2.10  1.0 - 4.8 k/uL Final    Absolute Mono # 10/24/2020 0.60  0.1 - 1.3 k/uL Final    Absolute Eos # 10/24/2020 0.10  0.0 - 0.4 k/uL Final    Basophils Absolute 10/24/2020 0.00  0.0 - 0.2 k/uL Final    Absolute Immature Granulocyte 10/24/2020 NOT REPORTED  0.00 - 0.30 k/uL Final    WBC Morphology 10/24/2020 NOT REPORTED   Final    RBC Morphology 10/24/2020 NOT REPORTED   Final    Platelet Estimate 13/17/4230 NOT REPORTED   Final    Glucose 10/24/2020 101* 70 - 99 mg/dL Final    BUN 10/24/2020 13  6 - 20 mg/dL Final    CREATININE 10/24/2020 0.67  0.50 - 0.90 mg/dL Final    Bun/Cre Ratio 10/24/2020 NOT REPORTED  9 - 20 Final    Calcium 10/24/2020 9.6  8.6 - 10.4 mg/dL Final    Sodium 10/24/2020 143  135 - 144 mmol/L Final    Potassium 10/24/2020 4.9  3.7 - 5.3 mmol/L Final    Chloride 10/24/2020 108* 98 - 107 mmol/L Final    CO2 10/24/2020 28  20 - 31 mmol/L Final    Anion Gap 10/24/2020 7* 9 - 17 mmol/L Final    GFR Non- 10/24/2020 >60  >60 mL/min Final    GFR  10/24/2020 >60  >60 mL/min Final    GFR Comment 10/24/2020        Final    Comment: Average GFR for 38-51 years old:   80 mL/min/1.73sq m  Chronic Kidney Disease:   <60 mL/min/1.73sq m  Kidney failure:   <15 mL/min/1.73sq m              eGFR calculated using average adult body mass.  Additional eGFR calculator available at:        [employer]            GFR Staging 10/24/2020 NOT REPORTED   Final    Ventricular Rate 10/26/2020 86  BPM Preliminary    Atrial Rate 10/26/2020 86  BPM Preliminary    P-R Interval 10/26/2020 140  ms Preliminary    QRS Duration 10/26/2020 92  ms Preliminary    Q-T Interval 10/26/2020 342  ms Preliminary    QTc Calculation (Bazett) 10/26/2020 409  ms Preliminary    P Axis 10/26/2020 55  degrees Preliminary    R Axis 10/26/2020 82  degrees Preliminary    T Axis 10/26/2020 63  degrees Preliminary    TSH 10/24/2020 0.79  0.30 - 5.00 mIU/L Final            Medications  Current Facility-Administered Medications: pantoprazole (PROTONIX) tablet 40 mg, 40 mg, Oral, QAM AC  benztropine (COGENTIN) tablet 1 mg, 1 mg, Oral, BID PRN  witch hazel-glycerin (TUCKS) pad, , Topical, PRN  QUEtiapine (SEROQUEL) tablet 300 mg, 300 mg, Oral, BID  naproxen (NAPROSYN) tablet 250 mg, 250 mg, Oral, BID PRN  budesonide-formoterol (SYMBICORT) 160-4.5 MCG/ACT inhaler 2 puff, 2 puff, Inhalation, BID  montelukast (SINGULAIR) tablet 10 mg, 10 mg, Oral, Nightly  albuterol sulfate  (90 Base) MCG/ACT inhaler 2 puff, 2 puff, Inhalation, Q6H PRN  acetaminophen (TYLENOL) tablet 650 mg, 650 mg, Oral, Q4H PRN  aluminum & magnesium hydroxide-simethicone (MAALOX) 200-200-20 MG/5ML suspension 30 mL, 30 mL, Oral, Q6H PRN  hydrOXYzine (ATARAX) tablet 50 mg, 50 mg, Oral, TID PRN  traZODone (DESYREL) tablet 50 mg, 50 mg, Oral, Nightly PRN  polyethylene glycol (GLYCOLAX) packet 17 g, 17 g, Oral, Daily PRN  nicotine polacrilex (NICORETTE) gum 2 mg, 2 mg, Oral, PRN  nicotine (NICODERM CQ) 14 MG/24HR 1 patch, 1 patch, Transdermal, Daily  diphenhydrAMINE (BENADRYL) injection 50 mg, 50 mg, Intramuscular, Q4H PRN **AND** LORazepam (ATIVAN) injection 2 mg, 2 mg, Intramuscular, Q4H PRN **AND** haloperidol lactate (HALDOL) injection 5 mg, 5 mg, Intramuscular, Q4H PRN  haloperidol (HALDOL) tablet 5 mg, 5 mg, Oral, Q4H PRN **AND** LORazepam (ATIVAN) tablet 2 mg, 2 mg, Oral, Q4H PRN  amLODIPine (NORVASC) tablet 5 mg, 5 mg, Oral, Daily    ASSESSMENT  Bipolar, moustapha    PLAN  Patients symptoms are slightly improved  Continue current meds, the patient consents to treatment  Aim for discharge tomorrow  COVID-19 test requested for shelter placement  Encourage participation in groups and milieu  Attempt to develop insight  Follow-up daily while on inpatient unit    Electronically signed by Miguel Angel Mongtomery MD on 10/28/2020 at 6:33 PM

## 2020-10-28 NOTE — GROUP NOTE
Group Therapy Note    Date: 10/28/2020    Group Start Time: 1100  Group End Time: 1150  Group Topic: Psychoeducation    [facility]    [place], [address]        Group Therapy Note    Attendees: 5/11         Patient's Goal:  To increase interpersonal interaction. Notes:  PT attended and participated in group. Status After Intervention:  Improved    Participation Level:  Active Listener and Interactive    Participation Quality: Appropriate and Attentive      Speech:  normal      Thought Process/Content: Logical      Affective Functioning: Congruent      Mood: euthymic      Level of consciousness:  Alert and Attentive      Response to Learning: Progressing to goal      Endings: None Reported    Modes of Intervention: Socialization, Activity, Media and Reality-testing      Discipline Responsible: Psychoeducational Specialist      Signature:  Luly Storm

## 2020-10-28 NOTE — DISCHARGE SUMMARY
file     Minutes per session: Not on file    Stress: Not on file   Relationships    Social connections     Talks on phone: Not on file     Gets together: Not on file     Attends Zoroastrianism service: Not on file     Active member of club or organization: Not on file     Attends meetings of clubs or organizations: Not on file     Relationship status: Not on file    Intimate partner violence     Fear of current or ex partner: Not on file     Emotionally abused: Not on file     Physically abused: Not on file     Forced sexual activity: Not on file   Other Topics Concern    Not on file   Social History Narrative    Not on file       MEDICATIONS:    Current Facility-Administered Medications:     pantoprazole (PROTONIX) tablet 40 mg, 40 mg, Oral, QAM AC, Lilly Joseph MD, 40 mg at 10/28/20 0831    benztropine (COGENTIN) tablet 1 mg, 1 mg, Oral, BID PRN, Lilly Joseph MD, 1 mg at 10/26/20 0955    witch hazel-glycerin (TUCKS) pad, , Topical, PRN, Lilly Joseph MD, 40 each at 10/28/20 1054    QUEtiapine (SEROQUEL) tablet 300 mg, 300 mg, Oral, BID, Lilly Joseph MD, 300 mg at 10/28/20 0831    naproxen (NAPROSYN) tablet 250 mg, 250 mg, Oral, BID PRN, Karmen Hermosillo MD, 250 mg at 10/27/20 0916    budesonide-formoterol (SYMBICORT) 160-4.5 MCG/ACT inhaler 2 puff, 2 puff, Inhalation, BID, Connie Glover MD    montelukast (SINGULAIR) tablet 10 mg, 10 mg, Oral, Nightly, Connie Glover MD, 10 mg at 10/27/20 2143    albuterol sulfate  (90 Base) MCG/ACT inhaler 2 puff, 2 puff, Inhalation, Q6H PRN, Connie Glover MD    acetaminophen (TYLENOL) tablet 650 mg, 650 mg, Oral, Q4H PRN, Karmen Hermosillo MD, 650 mg at 10/27/20 0659    aluminum & magnesium hydroxide-simethicone (MAALOX) 200-200-20 MG/5ML suspension 30 mL, 30 mL, Oral, Q6H PRN, Karmen Hermosillo MD    hydrOXYzine (ATARAX) tablet 50 mg, 50 mg, Oral, TID PRN, Karmen Hermosillo MD, 50 mg at 10/27/20 2143    traZODone (DESYREL) tablet 50 mg, 50 mg, Oral, Nightly PRN, Julieanne Alpers, MD    polyethylene glycol (GLYCOLAX) packet 17 g, 17 g, Oral, Daily PRN, Julieanne Alpers, MD    nicotine polacrilex (NICORETTE) gum 2 mg, 2 mg, Oral, PRN, Julieanne Alpers, MD    nicotine (NICODERM CQ) 14 MG/24HR 1 patch, 1 patch, Transdermal, Daily, Julieanne Alpers, MD, 1 patch at 10/28/20 3040    diphenhydrAMINE (BENADRYL) injection 50 mg, 50 mg, Intramuscular, Q4H PRN **AND** LORazepam (ATIVAN) injection 2 mg, 2 mg, Intramuscular, Q4H PRN **AND** haloperidol lactate (HALDOL) injection 5 mg, 5 mg, Intramuscular, Q4H PRN, Julieanne Alpers, MD    haloperidol (HALDOL) tablet 5 mg, 5 mg, Oral, Q4H PRN, 5 mg at 10/25/20 2005 **AND** LORazepam (ATIVAN) tablet 2 mg, 2 mg, Oral, Q4H PRN, Julieanne Alpers, MD, 2 mg at 10/25/20 2006    amLODIPine (NORVASC) tablet 5 mg, 5 mg, Oral, Daily, Moira Perez MD, 5 mg at 10/28/20 0831    Examination:  BP (!) 120/100   Pulse 103   Temp 98.3 °F (36.8 °C) (Oral)   Resp 16   Ht 5' 7\" (1.702 m)   Wt 134 lb (60.8 kg)   BMI 20.99 kg/m²   Gait - steady    HOSPITAL COURSE[de-identified]  Following admission to the hospital, patient had a complete physical exam and blood work up. The patient was referred to internal medicine for hypertension  Patient was monitored closely with suicide precaution  Patient was started on Seroquel which was titrated up to 300 mg twice daily  Was encouraged to participate in group and other milieu activity  Patient started to feel better with this combination of treatment. Significant progress in the symptoms since admission.     Mood is improved  The patient denies AVH or paranoid thoughts  The patient denies any hopelessness or worthlessness  No active SI/HI  Appetite:  [x] Normal  [] Increased  [] Decreased    Sleep:       [x] Normal  [] Fair       [] Poor            Energy:    [x] Normal  [] Increased  [] Decreased     SI [] Present  [x] Absent  HI  []Present  [x] Absent   Aggression:  [] yes  [] no  Patient is [x] able [] unable to CONTRACT FOR SAFETY   Medication side effects(SE):  [x] None(Psych. Meds.) [] Other      Mental Status Examination on discharge:    Level of consciousness:  within normal limits   Appearance: Slightly disheveled  Behavior/Motor:  no abnormalities noted  Attitude toward examiner:  attentive and good eye contact  Speech:  spontaneous, normal rate and normal volume   Mood: irritable and labile  Affect:  mood congruent  Thought processes:  linear, goal directed and coherent   Thought content:  Suicidal Ideation:  denies suicidal ideation  Delusions:  no evidence of delusions  Perceptual Disturbance:  denies any perceptual disturbance  Cognition:  oriented to person, place, and time   Concentration intact  Memory intact  Insight fair  Judgement fair   Fund of Knowledge adequate      ASSESSMENT:  Patient symptoms are:  [] Well controlled  [x] Improving  [] Worsening  [] No change      Diagnosis:  Active Problems:    Essential hypertension    Migraine without aura and without status migrainosus, not intractable    MS (multiple sclerosis) ([facility])    Moderate intermittent asthma    Acute psychosis ([facility] [address]  Resolved Problems:    * No resolved hospital problems. *      LABS:    No results for input(s): WBC, HGB, PLT in the last 72 hours. No results for input(s): NA, K, CL, CO2, BUN, CREATININE, GLUCOSE in the last 72 hours. No results for input(s): BILITOT, ALKPHOS, AST, ALT in the last 72 hours. Lab Results   Component Value Date    BARBSCNU NEGATIVE 02/19/2016    LABBENZ NEGATIVE 02/19/2016    LABMETH NEGATIVE 02/19/2016    PPXUR NOT REPORTED 02/19/2016     Lab Results   Component Value Date    TSH 0.79 10/24/2020     No results found for: LITHIUM  No results found for: VALPROATE, CBMZ    RISK ASSESSMENT AT DISCHARGE: Low risk for suicide and homicide. Treatment Plan:  Reviewed current Medications with the patient. Education provided on the complaince with treatment.     Risks, benefits, side effects, drug-to-drug interactions and alternatives to treatment were discussed. Encourage patient to attend outpatient follow up appointment and therapy. Patient was advised to call the outpatient provider, visit the nearest ED or call 911 if symptoms are not manageable. Medication List      START taking these medications    budesonide-formoterol 160-4.5 MCG/ACT Aero  Commonly known as:  SYMBICORT  Inhale 2 puffs into the lungs 2 times daily     montelukast 10 MG tablet  Commonly known as:  SINGULAIR  Take 1 tablet by mouth nightly     QUEtiapine 300 MG tablet  Commonly known as:  SEROQUEL  Take 1 tablet by mouth 2 times daily        CONTINUE taking these medications    albuterol sulfate  (90 Base) MCG/ACT inhaler  Commonly known as:  Ventolin HFA  Inhale 2 puffs into the lungs every 6 hours as needed for Wheezing        STOP taking these medications    HYDROcodone-acetaminophen 5-325 MG per tablet  Commonly known as:  [facility]           Where to Get Your Medications      These medications were sent to [facility], [address], [address] [address]    Phone:  [phone]   · albuterol sulfate  (90 Base) MCG/ACT inhaler  · budesonide-formoterol 160-4.5 MCG/ACT Aero  · montelukast 10 MG tablet  · QUEtiapine 300 MG tablet           TIME SPENT - 35 MINUTES TO COMPLETE THE EVALUATION, DISCHARGE SUMMARY, MEDICATION RECONCILIATION AND FOLLOW UP CARE                                         Cyndi Huber is a 54 y.o. female being evaluated Russell Stephens MD on 10/28/2020 at 12:00 PM    An electronic signature was used to authenticate this note. **This report has been created using voice recognition software. It may contain minor errors which are inherent in voice recognition technology. **

## 2020-10-28 NOTE — PLAN OF CARE
Problem: Anger Management/Homicidal Ideation:  Goal: Absence of angry outbursts  Description: Absence of angry outbursts  10/28/2020 1031 by Rubina Blevins RN  Outcome: Ongoing  No angry outbursts noted this shift. Patient is calm and cooperative, and is redirectable when situations occur. Problem: Anger Management/Homicidal Ideation:  Goal: Absence of homicidal ideation  Description: Absence of homicidal ideation  Outcome: Ongoing  Patient is currently denying thoughts of wanting to harm self or others. Problem: Altered Mood, Manic Behavior:  Goal: Ability to demonstrate self-control will improve  Description: Ability to demonstrate self-control will improve  10/28/2020 1031 by Rubina Blevins RN  Outcome: Ongoing  Patient is alert, observed in day area. Patient is currently denying thoughts of wanting to harm self or others. Patient reports not having any tactile, gustatory, auditory, olfactory, or visual hallucinations. Patient still remains hyper verbal with pressured speech, flight of ideas. Sleep and appetite adequate. Patient is medication compliant, denies any side effects. Patient is present for group and interacts with peers. Patient encouraged to shower. No further concerns voiced. Will continue to monitor.

## 2020-10-28 NOTE — GROUP NOTE
Group Therapy Note    Date: 10/28/2020    Group Start Time: 1000  Group End Time: 4435  Group Topic: Psychotherapy    CARLITOS Stokes        Group Therapy Note    Attendees: 4/11         Patient's Goal:  To focus on the here and now with mental health stability. Verbalize acceptance of situations they cannot control. Notes:  Pt is rapid speech, flight of ideas. Status After Intervention:  Unchanged    Participation Level:  Active Listener, Interactive and Monopolizing    Participation Quality: Appropriate, Attentive, Sharing and Supportive      Speech:  pressured      Thought Process/Content: delusional       Affective Functioning: Congruent      Mood: anxious      Level of consciousness:  Alert, Oriented x4 and Attentive      Response to Learning: Progressing to goal      Endings: None Reported    Modes of Intervention: Education, Support, Socialization and Exploration      Discipline Responsible: /Counselor      Signature:  CARLITOS العلي

## 2020-10-28 NOTE — GROUP NOTE
Group Therapy Note    Date: 10/28/2020    Group Start Time: 0900  Group End Time: 0915  Group Topic: Community Meeting    BRAD Gilliland [place], [place]        Group Therapy Note    Attendees: 9/23      Pt did not attend [employer] d/t resting in room despite staff invitation to attend. 1:1 talk time offered as alternative to group session, pt declined.

## 2020-10-29 VITALS
DIASTOLIC BLOOD PRESSURE: 85 MMHG | HEART RATE: 95 BPM | RESPIRATION RATE: 16 BRPM | TEMPERATURE: 98 F | WEIGHT: 134 LBS | BODY MASS INDEX: 21.03 KG/M2 | HEIGHT: 67 IN | SYSTOLIC BLOOD PRESSURE: 126 MMHG

## 2020-10-29 LAB
SARS-COV-2, RAPID: NORMAL
SARS-COV-2: NORMAL
SARS-COV-2: NOT DETECTED
SOURCE: NORMAL

## 2020-10-29 PROCEDURE — 6370000000 HC RX 637 (ALT 250 FOR IP): Performed by: PSYCHIATRY & NEUROLOGY

## 2020-10-29 PROCEDURE — 99239 HOSP IP/OBS DSCHRG MGMT >30: CPT | Performed by: PSYCHIATRY & NEUROLOGY

## 2020-10-29 PROCEDURE — 6370000000 HC RX 637 (ALT 250 FOR IP): Performed by: INTERNAL MEDICINE

## 2020-10-29 RX ADMIN — POLYETHYLENE GLYCOL 3350 17 G: 17 POWDER, FOR SOLUTION ORAL at 08:50

## 2020-10-29 RX ADMIN — QUETIAPINE FUMARATE 300 MG: 300 TABLET ORAL at 08:20

## 2020-10-29 RX ADMIN — HYDROXYZINE HYDROCHLORIDE 50 MG: 50 TABLET, FILM COATED ORAL at 00:10

## 2020-10-29 RX ADMIN — POLYETHYLENE GLYCOL 3350 17 G: 17 POWDER, FOR SOLUTION ORAL at 00:10

## 2020-10-29 RX ADMIN — AMLODIPINE BESYLATE 5 MG: 5 TABLET ORAL at 08:20

## 2020-10-29 RX ADMIN — PANTOPRAZOLE SODIUM 40 MG: 40 TABLET, DELAYED RELEASE ORAL at 06:32

## 2020-10-29 NOTE — GROUP NOTE
Group Therapy Note    Date: 10/29/2020    Group Start Time: 0900  Group End Time: 0915  Group Topic: Community Meeting    [address] [facility] [address]    Patient declined to attend community meeting/goal setting group at 0900 despite encouragement from staff. 1:1 talk time offered by staff as alternative to group session.       Signature:  Arnoldo Molina

## 2020-10-29 NOTE — PROGRESS NOTES
Group Therapy Note    Date: 10/29/2020    Group Start Time: 1330  Group End Time: 1830  Group Topic: Psychoeducation    BRAD Corbett, [facility]    Patient unable to attend Psychoeducation Group at 1330 due to working on discharge planning and awaiting discharge from the unit. 1:1 talk time offered.     Signature:  Mily Munguia

## 2020-10-29 NOTE — DISCHARGE SUMMARY
file    Stress: Not on file   Relationships    Social connections     Talks on phone: Not on file     Gets together: Not on file     Attends Episcopalian service: Not on file     Active member of club or organization: Not on file     Attends meetings of clubs or organizations: Not on file     Relationship status: Not on file    Intimate partner violence     Fear of current or ex partner: Not on file     Emotionally abused: Not on file     Physically abused: Not on file     Forced sexual activity: Not on file   Other Topics Concern    Not on file   Social History Narrative    Not on file       MEDICATIONS:    Current Facility-Administered Medications:     pantoprazole (PROTONIX) tablet 40 mg, 40 mg, Oral, QAM AC, Jon Armstrong MD, 40 mg at 10/29/20 8031    benztropine (COGENTIN) tablet 1 mg, 1 mg, Oral, BID PRN, Jon Armstrong MD, 1 mg at 10/26/20 0955    witch hazel-glycerin (TUCKS) pad, , Topical, PRN, Jon Armstrong MD, 40 each at 10/28/20 2043    QUEtiapine (SEROQUEL) tablet 300 mg, 300 mg, Oral, BID, Jon Armstrong MD, 300 mg at 10/29/20 0820    naproxen (NAPROSYN) tablet 250 mg, 250 mg, Oral, BID PRN, Cristela Renner MD, 250 mg at 10/28/20 2027    budesonide-formoterol (SYMBICORT) 160-4.5 MCG/ACT inhaler 2 puff, 2 puff, Inhalation, BID, Johanna Chi MD    montelukast (SINGULAIR) tablet 10 mg, 10 mg, Oral, Nightly, Johanna Chi MD, 10 mg at 10/28/20 2141    albuterol sulfate  (90 Base) MCG/ACT inhaler 2 puff, 2 puff, Inhalation, Q6H PRN, Johanna Chi MD    acetaminophen (TYLENOL) tablet 650 mg, 650 mg, Oral, Q4H PRN, Cristela Renner MD, 650 mg at 10/27/20 0659    aluminum & magnesium hydroxide-simethicone (MAALOX) 200-200-20 MG/5ML suspension 30 mL, 30 mL, Oral, Q6H PRN, Cristela Renner MD    hydrOXYzine (ATARAX) tablet 50 mg, 50 mg, Oral, TID PRN, Cristela Renner MD, 50 mg at 10/29/20 0010    traZODone (DESYREL) tablet 50 mg, 50 mg, Oral, Nightly PRN, Cristela Renner MD  [facility] polyethylene glycol (GLYCOLAX) packet 17 g, 17 g, Oral, Daily PRN, Barron Varma MD, 17 g at 10/29/20 0850    nicotine polacrilex (NICORETTE) gum 2 mg, 2 mg, Oral, PRN, Barron Varma MD    nicotine (NICODERM CQ) 14 MG/24HR 1 patch, 1 patch, Transdermal, Daily, Barron Varma MD, 1 patch at 10/29/20 1108    diphenhydrAMINE (BENADRYL) injection 50 mg, 50 mg, Intramuscular, Q4H PRN **AND** LORazepam (ATIVAN) injection 2 mg, 2 mg, Intramuscular, Q4H PRN **AND** haloperidol lactate (HALDOL) injection 5 mg, 5 mg, Intramuscular, Q4H PRN, Barron Varma MD    haloperidol (HALDOL) tablet 5 mg, 5 mg, Oral, Q4H PRN, 5 mg at 10/25/20 2005 **AND** LORazepam (ATIVAN) tablet 2 mg, 2 mg, Oral, Q4H PRN, Barron Varma MD, 2 mg at 10/25/20 2006    amLODIPine (NORVASC) tablet 5 mg, 5 mg, Oral, Daily, Sangeeta Bhakta MD, 5 mg at 10/29/20 0820    Examination:  /85   Pulse 95   Temp 98 °F (36.7 °C) (Oral)   Resp 16   Ht 5' 7\" (1.702 m)   Wt 134 lb (60.8 kg)   BMI 20.99 kg/m²   Gait - steady    HOSPITAL COURSE[de-identified]  Following admission to the hospital, patient had a complete physical exam and blood work up, which was unremarkable. The patient was referred to internal medicine for hypertension  Patient was monitored closely with suicide precaution  Patient was started on Seroquel which was titrated up to 300 mg twice a day  Was encouraged to participate in group and other milieu activity  Patient started to feel better with this combination of treatment. Significant progress in the symptoms since admission.     Mood is improved but patient continues to have some pressured speech and flight of ideas the patient denies AVH or paranoid thoughts  The patient denies any hopelessness or worthlessness  No active SI/HI  Appetite:  [x] Normal  [] Increased  [] Decreased    Sleep:       [x] Normal  [] Fair       [] Poor            Energy:    [x] Normal  [] Increased  [] Decreased     SI [] Present  [x] Absent  HI []Present  [x] Absent   Aggression:  [] yes  [] no  Patient is [x] able  [] unable to CONTRACT FOR SAFETY   Medication side effects(SE):  [x] None(Psych. Meds.) [] Other      Mental Status Examination on discharge:    Level of consciousness:  within normal limits   Appearance:  well-appearing  Behavior/Motor:  no abnormalities noted  Attitude toward examiner:  attentive and good eye contact  Speech: Rapid and pressured  Mood: euthymic  Affect:  mood congruent  Thought processes:  flight of ideas   Thought content:  Suicidal Ideation:  denies suicidal ideation  Delusions:  no evidence of delusions  Perceptual Disturbance:  denies any perceptual disturbance  Cognition:  oriented to person, place, and time   Concentration intact  Memory intact  Insight good   Judgement fair   Fund of Knowledge adequate      ASSESSMENT:  Patient symptoms are:  [x] Well controlled  [x] Improving  [] Worsening  [] No change      Diagnosis:  Active Problems:    Essential hypertension    Migraine without aura and without status migrainosus, not intractable    MS (multiple sclerosis) ([facility])    Moderate intermittent asthma    Acute psychosis ([facility] [address]    Affective psychosis, bipolar ([facility] [address]  Resolved Problems:    * No resolved hospital problems. *      LABS:    No results for input(s): WBC, HGB, PLT in the last 72 hours. No results for input(s): NA, K, CL, CO2, BUN, CREATININE, GLUCOSE in the last 72 hours. No results for input(s): BILITOT, ALKPHOS, AST, ALT in the last 72 hours. Lab Results   Component Value Date    BARBSCNU NEGATIVE 02/19/2016    LABBENZ NEGATIVE 02/19/2016    LABMETH NEGATIVE 02/19/2016    PPXUR NOT REPORTED 02/19/2016     Lab Results   Component Value Date    TSH 0.79 10/24/2020     No results found for: LITHIUM  No results found for: VALPROATE, CBMZ    RISK ASSESSMENT AT DISCHARGE: Low risk for suicide and homicide. Treatment Plan:  Reviewed current Medications with the patient.  Education provided on the complaince with treatment. Risks, benefits, side effects, drug-to-drug interactions and alternatives to treatment were discussed. Encourage patient to attend outpatient follow up appointment and therapy. Patient was advised to call the outpatient provider, visit the nearest ED or call 911 if symptoms are not manageable. Medication List      START taking these medications    budesonide-formoterol 160-4.5 MCG/ACT Aero  Commonly known as:  SYMBICORT  Inhale 2 puffs into the lungs 2 times daily  Notes to patient:  For asthma      montelukast 10 MG tablet  Commonly known as:  SINGULAIR  Take 1 tablet by mouth nightly  Notes to patient:  For asthma      QUEtiapine 300 MG tablet  Commonly known as:  SEROQUEL  Take 1 tablet by mouth 2 times daily  Notes to patient: To control mood         CONTINUE taking these medications    albuterol sulfate  (90 Base) MCG/ACT inhaler  Commonly known as:  Ventolin HFA  Inhale 2 puffs into the lungs every 6 hours as needed for Wheezing  Notes to patient:  As needed for wheezing         STOP taking these medications    HYDROcodone-acetaminophen 5-325 MG per tablet  Commonly known as:  Edna Atwood           Where to Get Your Medications      These medications were sent to [facility], [address], [address] [facility] [address]    Phone:  [phone]   · albuterol sulfate  (90 Base) MCG/ACT inhaler  · budesonide-formoterol 160-4.5 MCG/ACT Aero  · montelukast 10 MG tablet  · QUEtiapine 300 MG tablet           TIME SPENT - 35 MINUTES TO COMPLETE THE EVALUATION, DISCHARGE SUMMARY, MEDICATION RECONCILIATION AND FOLLOW UP CARE                                         Cristina Huber is a 54 y.o. female being evaluated Eufemia Thomas MD on 10/29/2020 at 11:12 AM    An electronic signature was used to authenticate this note. **This report has been created using voice recognition software. It may contain minor errors which are inherent in voice recognition technology. **

## 2020-10-29 NOTE — PROGRESS NOTES
Problem: Anger Management/Homicidal Ideation:  Goal: Absence of angry outbursts  Description: Absence of angry outbursts  10/27/2020 2145 by Quinn King RN  Outcome: Ongoing   Pt is pleasant and cooperative, reports she is ready and eager for discharge  Problem: Altered Mood, Manic Behavior:  Goal: Able to verbalize decrease in frequency and intensity of racing thoughts  Description: Able to verbalize decrease in frequency and intensity of racing thoughts  10/27/2020 2145 by Quinn King RN  Outcome: Ongoing   She is hyperverbal, speech is pressured  Problem: Altered Mood, Manic Behavior:  Goal: Ability to demonstrate self-control will improve  Description: Ability to demonstrate self-control will improve  10/27/2020 2145 by Quinn King RN  Outcome: Ongoing   Boundaries are poor but she is accepting of direction when needed  Problem: Pain:  Goal: Pain level will decrease  Description: Pain level will decrease  Outcome: Ongoing   Pt is satisfied with pain management

## 2020-10-29 NOTE — GROUP NOTE
Group Therapy Note    Date: 10/29/2020    Group Start Time: 1100  Group End Time: 3075  Group Topic: Recreational    [facility] Jeronimo Pedersen    Attendees: 3         Patient's Goal:  To demonstrate increased interpersonal skills. Notes:  Patient attended group and actively participated in task at hand. Patient needed continuous redirection due to having flight of idea, rapid speech and poor focus.      Status After Intervention:  Unchanged    Participation Level: Interactive    Participation Quality: Appropriate and Sharing      Speech:  Rapud      Thought Process/Content: Logical  Flight of ideas      Affective Functioning: Congruent      Mood: euthymic      Level of consciousness:  Alert and Oriented x4      Response to Learning: Able to verbalize current knowledge/experience, Able to verbalize/acknowledge new learning, Able to retain information, Capable of insight and Progressing to goal      Endings: None Reported       Modes of Intervention: Socialization, Exploration, Clarifying, Problem-solving, Activity, Limit-setting and Reality-testing      Discipline Responsible: Psychoeducational Specialist      Signature:  Jade Lui

## 2020-10-29 NOTE — BH NOTE
[address] [facility]  Discharge Note    Pt discharged with followings belongings:   Dentures: None  Vision - Corrective Lenses: None  Hearing Aid: None  Jewelry: None  Body Piercings Removed: N/A  Clothing: Pants, Shirt, Socks, Undergarments (Comment), Other (Comment)(shoes, winter coat, zip hoodie, watchmanx cap, purple bag, leopard bag, red bad, 2 rocks, sunglasses, tissues, shampoo, conditioner, body wash, lip gloss, dog leash, deg med, one glove)  Were All Patient Medications Collected?: Not Applicable  Other Valuables: (pen knife, cigarettes, shoe laces, 3 lighters, nail clippers, q-tips, tweezers)   Valuables sent home with patient. Valuables retrieved from safe, Security envelope number:  [id number] and returned to patient. Patient education on aftercare instructions: yes  Information faxed to [facility] by nurse Patient verbalize understanding of AVS: yes. Status EXAM upon discharge:  Status and Exam  Normal: No  Facial Expression: Exaggerated  Affect: Unstable  Level of Consciousness: Alert  Mood:Normal: No  Mood: Anxious  Motor Activity:Normal: No  Motor Activity: Increased  Interview Behavior: Cooperative, Impulsive  Preception: [place] to Time, [place] to Person, [place] to Place, [place] to Situation  Attention:Normal: No  Attention: Distractible, Hyperalert, Unable to Concentrate  Thought Processes: Flt.of Ideas  Thought Content:Normal: No  Thought Content: Preoccupations  Hallucinations: None  Delusions: No  Delusions: Persecution  Memory:Normal: Yes  Memory: Poor Recent  Insight and Judgment: Yes  Insight and Judgment: Unrealistic  Present Suicidal Ideation: No  Present Homicidal Ideation: No      Metabolic Screening:    No results found for: LABA1C    No results found for: CHOL  No results found for: TRIG  No results found for: HDL  No components found for: LDLCAL  No results found for: Sheridan Briggs LPN    Pt [facility].

## 2020-10-29 NOTE — BH NOTE
Patient given tobacco quitline number 63527034742 at this time, refusing to call at this time, states \" I just dont want to quit now\"- patient given information as to the dangers of long term tobacco use. Continue to reinforce the importance of tobacco cessation.

## 2020-11-01 LAB
EKG ATRIAL RATE: 86 BPM
EKG P AXIS: 55 DEGREES
EKG P-R INTERVAL: 140 MS
EKG Q-T INTERVAL: 342 MS
EKG QRS DURATION: 92 MS
EKG QTC CALCULATION (BAZETT): 409 MS
EKG R AXIS: 82 DEGREES
EKG T AXIS: 63 DEGREES
EKG VENTRICULAR RATE: 86 BPM

## 2020-11-02 NOTE — CARE COORDINATION
Name: Baron Bhandari    : 1975    Discharge Date: 10/29/2020    Primary Auth/Cert #: [id number]    Discharge Medications:      Medication List      START taking these medications    budesonide-formoterol 160-4.5 MCG/ACT Aero  Commonly known as:  SYMBICORT  Inhale 2 puffs into the lungs 2 times daily  Notes to patient:  For asthma      montelukast 10 MG tablet  Commonly known as:  SINGULAIR  Take 1 tablet by mouth nightly  Notes to patient:  For asthma      QUEtiapine 300 MG tablet  Commonly known as:  SEROQUEL  Take 1 tablet by mouth 2 times daily  Notes to patient: To control mood         CONTINUE taking these medications    albuterol sulfate  (90 Base) MCG/ACT inhaler  Commonly known as:  Ventolin HFA  Inhale 2 puffs into the lungs every 6 hours as needed for Wheezing  Notes to patient:  As needed for wheezing         STOP taking these medications    HYDROcodone-acetaminophen 5-325 MG per tablet  Commonly known as:  Danni Meliton           Where to Get Your Medications      These medications were sent to [id number], [address] [address]    Phone:  [phone]   · albuterol sulfate  (90 Base) MCG/ACT inhaler  · budesonide-formoterol 160-4.5 MCG/ACT Aero  · montelukast 10 MG tablet  · QUEtiapine 300 MG tablet         Follow Up Appointment: [facility] PR[facility]O [facility]  Address: [address] [place] [address] Road  Phone: [phone]  Fax D/C to: [phone]   On 2020  [facility] d/c appointment @2:30PM with Iraj Vallejo.